# Patient Record
Sex: FEMALE | Race: BLACK OR AFRICAN AMERICAN | URBAN - METROPOLITAN AREA
[De-identification: names, ages, dates, MRNs, and addresses within clinical notes are randomized per-mention and may not be internally consistent; named-entity substitution may affect disease eponyms.]

---

## 2017-01-04 ENCOUNTER — OFFICE VISIT (OUTPATIENT)
Dept: PALLIATIVE MEDICINE | Facility: CLINIC | Age: 67
End: 2017-01-04
Payer: COMMERCIAL

## 2017-01-04 VITALS
DIASTOLIC BLOOD PRESSURE: 75 MMHG | SYSTOLIC BLOOD PRESSURE: 131 MMHG | WEIGHT: 172 LBS | HEART RATE: 87 BPM | BODY MASS INDEX: 37.11 KG/M2 | HEIGHT: 57 IN

## 2017-01-04 DIAGNOSIS — M75.42 IMPINGEMENT SYNDROME OF LEFT SHOULDER: ICD-10-CM

## 2017-01-04 DIAGNOSIS — G89.29 CHRONIC BILATERAL LOW BACK PAIN WITH LEFT-SIDED SCIATICA: ICD-10-CM

## 2017-01-04 DIAGNOSIS — M54.16 LUMBAR RADICULOPATHY: ICD-10-CM

## 2017-01-04 DIAGNOSIS — M47.819 FACET ARTHROPATHY: ICD-10-CM

## 2017-01-04 DIAGNOSIS — M25.512 CHRONIC LEFT SHOULDER PAIN: Primary | ICD-10-CM

## 2017-01-04 DIAGNOSIS — M54.12 CERVICAL RADICULOPATHY: ICD-10-CM

## 2017-01-04 DIAGNOSIS — M54.42 CHRONIC BILATERAL LOW BACK PAIN WITH LEFT-SIDED SCIATICA: ICD-10-CM

## 2017-01-04 DIAGNOSIS — G89.29 CHRONIC LEFT SHOULDER PAIN: Primary | ICD-10-CM

## 2017-01-04 DIAGNOSIS — M54.2 CERVICALGIA: ICD-10-CM

## 2017-01-04 PROCEDURE — 99215 OFFICE O/P EST HI 40 MIN: CPT | Performed by: ANESTHESIOLOGY

## 2017-01-04 RX ORDER — GABAPENTIN 100 MG/1
CAPSULE ORAL
Qty: 180 CAPSULE | Refills: 1 | Status: SHIPPED | OUTPATIENT
Start: 2017-01-04

## 2017-01-04 ASSESSMENT — PAIN SCALES - GENERAL: PAINLEVEL: EXTREME PAIN (8)

## 2017-01-04 NOTE — NURSING NOTE
"Chief Complaint   Patient presents with     Pain       Initial /75 mmHg  Pulse 87  Ht 1.448 m (4' 9\")  Wt 78.019 kg (172 lb)  BMI 37.21 kg/m2 Estimated body mass index is 37.21 kg/(m^2) as calculated from the following:    Height as of this encounter: 1.448 m (4' 9\").    Weight as of this encounter: 78.019 kg (172 lb).  BP completed using cuff size: Sujata Russell MA      "

## 2017-01-04 NOTE — PROGRESS NOTES
[facility]    Date of visit: 1/4/2017    Chief complaint:   Chief Complaint   Patient presents with     Pain       Interval history:  Marlon Roberto was last seen by me on 11/30/16.      Recommendations/plan at the last visit included:  Plan:  1.  Pain physical therapy eval and treat  2.  Gabapentin 100 mg start with one at bedtime for 3 days, then one twice a day for 3 days, then one 3 times per day  3.  Continue Ibuprofen 800 mg no more than 3 times per day  4.  Would benefit from cervical (cervical) epidural steroid injection to target the left C4 nerve root for shoulder pain - patient is not ready for injections yet  5.  Would likely benefit from lumbar (lower back) epidural steroid injection as well due to stenosis (narrowing) at L4-5 - patient is not ready for injections yet  6.  Patient is not ready for pain psychology yet  7.  Trial of muscle relaxant (flexeril)  - start with a half tablet up to 3 times per day  8.  Follow up in 4-6 weeks    Since her last visit, Marlon Roberto reports:  She states that her pain is well controlled with Ibuprofen while at home and she can control her activities.  She has only been taking the Gabapentin while at work when her pain is bad and is not sure if it is helping or not.  She did not increase to three times per day as instructed.  She did not go to physical therapy stating insurance reasons.  She states she will schedule therapy now that the new year has started.    Her worst pain today is in the left side of the neck and down the left arm to about the elbow.  Her pain feels deep inside with a heavy feeling in the left shoulder.  Her right side is not bothering her today.  Her left arm feels weak at times.  She still gets some numbness in her hands after sleeping, usually on the left.    Her pain is worsened while at work with lifting and bending activities and with use of the arms.  She states that her whole back hurts but can't be sure  what hurts her the worst.  She has pain in the lower back with bending and twisting activities without pain down the legs.  She is very non-specific about her pain complaints.    She presents today with work restrictions form to be filled out for her employer.    Pain scores:  Pain intensity on average is 8 on a scale of 0-10. 10/10 while at work    Current pain treatments:   Gabapentin 100 mg TID - states she only takes while working  Ibuprofen 800 mg TID  Robaxin 500 mg - takes when she is ready to sleep    Past pain treatments:  Robaxin 500 mg  - prescribed but did not take  Flexeril - prescribed but did not take    Side Effects: edema and dizziness    Medications:  Current Outpatient Prescriptions   Medication Sig Dispense Refill     metoprolol (TOPROL-XL) 50 MG 24 hr tablet TAKE 1 TABLET (50 MG) BY MOUTH DAILY FOR BLOOD PRESSURE. 90 tablet 0     pravastatin (PRAVACHOL) 40 MG tablet TAKE ONE TABLET BY MOUTH DAILY IN THE EVENING . 90 tablet 1     ibuprofen (ADVIL/MOTRIN) 800 MG tablet TAKE 1 TABLET BY MOUTH 3 TIMES DAILY WITH MEALS AS NEEDED FOR JOINT/BACK PAIN 90 tablet 3     triamterene-hydrochlorothiazide (MAXZIDE-25) 37.5-25 MG per tablet TAKE ONE TABLET BY MOUTH DAILY 90 tablet 1     methocarbamol (ROBAXIN) 500 MG tablet Take 2 tablets (1,000 mg) by mouth 3 times daily as needed for muscle spasms 30 tablet 1     pravastatin (PRAVACHOL) 20 MG tablet Take 2 tablets (40 mg) by mouth every evening for cholesterol. 180 tablet 1     ORDER FOR DME Equipment being ordered: TEDS socks, closed toe calf high. 1 Device o     ASPIRIN 325 MG OR TBEC 1 tablet daily*       gabapentin (NEURONTIN) 100 MG capsule Take 1 capsule (100 mg) by mouth 3 times daily Take one at bedtime for 3 days, then one twice a day for 3 days, then one 3 times per day 90 capsule 0     Acetaminophen (TYLENOL PO)        PARoxetine (PAXIL) 20 MG tablet Take 1 tablet (20 mg) by mouth At Bedtime for anxiety prevention. 30 tablet 1     diazepam (VALIUM)  "2 MG tablet Take 0.5-2 tablets (1-4 mg) by mouth once as needed for anxiety or agitation (including before flying) 10 tablet 0     cetirizine (ZYRTEC) 10 MG tablet Take 1 tablet (10 mg) by mouth 2 times daily as needed for allergies (itching) 365 tablet prn       Medical History: any changes in medical history since they were last seen? No    Review of Systems:  The 14 system ROS was reviewed from the intake questionnaire, and is positive for: joint pain, joint stiffness, neck pain, back pain, painful urination  Any bowel or bladder problems: denies changes  Mood: good    Physical Exam:  /75 mmHg  Pulse 87  Ht 1.448 m (4' 9\")  Wt 78.019 kg (172 lb)  BMI 37.21 kg/m2  Constitutional: alert and no distress.  Pt is obese  Head: Normocephalic. No masses, lesions, tenderness or abnormalities  ENT: EOMI, mucosal surfaces moist.  Neck with full ROM, posture fair, hyperkyphotic thoracic spine  Cardiovascular: No edema or JVD appreciated.  Respiratory: Good diaphragmatic excursion. No wheezes appreciated.  Speaking in complete sentences without shortness of breath.  No accessory muscle use.   Musculoskeletal: extremities normal- no gross deformities noted, normal muscle tone and able to move about the exam room without difficulty.    Skin: no suspicious lesions or rashes appreciated on exposed areas  Neurologic: Gait normal, non-antalgic.  Able to stand on toes and heels. Moving all extremities spontaneously, no apparent weakness.    Psychiatric: mentation appears normal, affect full and good eye contact.      Cervical Spine:  Good range of motion, tender paravertebral muscles on the left with tenderness and spasm in the left trapezius.  Facet loading equivocal.  Spurling's not assessed    Lumbar Spine:  Tender lower lumbar paravertebrals, left greater than right, flexes well with lower back pain on straightening up. Facet loading positive left greater than right with extension and lateral rotation of the lumbar " spine.  Seated straight leg raising is negative bilaterally.    MR CERVICAL SPINE W/O CONTRAST 8/23/2016 11:00 AM   Findings:  Minimal retrolisthesis of C3 on C4. Preservation of the normal  cervical lordosis. Mild multilevel disc height loss, most probably a  C3-C4. Normal marrow signal. Normal cord signal and morphology.     The findings on a level by level basis are as follows:     C2-3: Disc osteophyte complex causing abuts the ventral aspect of the cord. Mild spinal canal narrowing. No significant neural foraminal  stenosis.     C3-4:  Disc osteophyte complex and uncinate hypertrophy. Ligament flavum redundancy. Mild/moderate left and mild right neural foraminal stenosis. Mild spinal canal stenosis.     C4-5:  No significant spinal canal or neural foraminal stenosis.     C5-6:  No significant spinal canal or neural foraminal stenosis.     C6-7:  No significant spinal canal or neural foraminal stenosis.     C7-T1:  Disc osteophyte complex. No significant neural foraminal or  spinal canal stenosis.      No abnormality of the paraspinous soft tissues.                                                                     Impression:    1. Cervical spondylosis as detailed above. At C3-C4, mild spinal canal  stenosis, mild to moderate left and mild right neural foraminal  stenosis.  2. No abnormal signal within the cervical cord.    MRI LUMBAR SPINE W/O CONTRAST 12/9/2014 12:11 PM  Impression:  Degenerative changes throughout the lumbar spine primarily consisting of facet arthropathy and ligamentum flavum thickening which results in mild to moderate narrowing of the spinal canal at L4-L5 and mild narrowing at L3-L4. Mild bilateral neural foraminal narrowing at L3-L4, L4-L5 and L5-S1.    Assessment:   1.  Chronic pain syndrome  2.  Chronic lower back pain  3.  Chronic neck pain  4.  Left shoulder pain  5.  Cervical radiculopathy - foraminal narrowing at C3-4 left  6.  Lumbar radiculopathy   7.  Myofascial pain (muscle  pain)  8.  Lumbosacral facet joint arthropathy  9.  Left shoulder pain with impingement    Marlon Roberto is a 66 year old female who is seen at the pain clinic for chronic pain that seems to be worse in her lower back and in the left shoulder.  She does have positive facet loading pain in the lumbar spine which may benefit from facet joint injections.  She also has deep shoulder pain with impingement suggesting rotator cuff problem.  She is very resistant to treatment with medications or injections and has not yet started physical therapy as instructed.    Plan:  1. Physical Therapy:  Proceed with physical therapy as ordered  2. Clinical Health Psychologist to address issues of relaxation, behavioral change, coping style, and other factors important to improvement.  deferred  3. Diagnostic Studies:  MRI left shoulder to rule out rotator cuff tear/injury  4. Medication Management:    1. Continue Ibuprofen 800 mg TID as needed  2. Increase Gabapentin to 200 mg three times per day  3. Continue Robaxin as needed  5. Further procedures recommended:   1. Lumbar facet joint injections bilaterally at L4-5 and L5-S1  2. Left shoulder subacromial bursa injection  6. Recommendations to PCP: defer pain complaints to pain management at this time.  However, if patient continues to decline treatments I won't have anything further to offer  7. Follow up: 6-8 weeks    Total time spent was 45 minutes, and more than 50% of face to face time was spent in counseling and/or coordination of care regarding medication management, physical therapy, behavioral modifications, and interventional procedures to decrease pain and improve function.

## 2017-01-04 NOTE — MR AVS SNAPSHOT
After Visit Summary   1/4/2017    Marlon Roberto    MRN: [id number]           Patient Information     Date Of Birth          1950        Visit Information        Provider Department      1/4/2017 10:00 AM Bert Fiore MD [facility]        Today's Diagnoses     Chronic left shoulder pain    -  1     Impingement syndrome of left shoulder         Cervicalgia         Chronic bilateral low back pain with left-sided sciatica         Cervical radiculopathy         Lumbar radiculopathy           Care Instructions    Assessment:   1.  Chronic pain syndrome  2.  Chronic lower back pain  3.  Chronic neck pain  4.  Left shoulder pain  5.  Cervical radiculopathy - foraminal narrowing at C3-4 left  6.  Lumbar radiculopathy   7.  Myofascial pain (muscle pain)  8.  Lumbosacral facet joint arthropathy  9.  Left shoulder pain with impingement    Marlon Roberto is a 66 year old female who is seen at the pain clinic for chronic pain that seems to be worse in her lower back and in the left shoulder.  She does have positive facet loading in the lumbar spine which may benefit from facet joint injections.  She also has deep shoulder pain with impingement suggesting rotator cuff problem.    Plan:  1. Physical Therapy:  Proceed with physical therapy as ordered  2. Clinical Health Psychologist to address issues of relaxation, behavioral change, coping style, and other factors important to improvement.  deferred  3. Diagnostic Studies:  MRI left shoulder to rule out rotator cuff tear/injury  4. Medication Management:    1. Continue Ibuprofen 800 mg TID as needed  2. Increase Gabapentin to 200 mg three times per day  3. Continue Robaxin as needed  5. Further procedures recommended:   1. Lumbar facet joint injections bilaterally at L4-5 and L5-S1  2. Left shoulder subacromial bursa injection  6. Recommendations to PCP: defer pain complaints to pain management at this time  7. Patient may  continue to work with restrictions to light duty and should refrain from frequent lifting, carrying, pushing, pulling, and bending or prolonged standing.  Refrain from lifting greater than 15 pounds.  8. Follow up: 6-8 weeks      Nurse Triage line:  [phone]   Call this number with any questions or concerns. You may leave a detailed message anytime. Calls are typically returned Monday through Friday between 8 AM and 4:30 PM. We usually get back to you within 2 business days depending on the issue/request.       Medication refills:    For non-narcotic medications, call your pharmacy directly to request a refill. The pharmacy will contact the [facility] for authorization. Please allow 3-4 days for these refills to be processed.     For narcotic refills, call the nurse triage line or send a Yap message. Please contact us 7-10 days before your refill is due. The message MUST include the name of the specific medication(s) requested and how you would like to receive the prescription(s). The options are as follows:    Pain Clinic staff can mail the prescription to your pharmacy. Please tell us the name of the pharmacy.    You may pick the prescription up at the Pain Clinic (tell us the location) or during a clinic visit with your pain provider    Pain Clinic staff can deliver the prescription to the [place] pharmacy in the clinic building. Please tell us the location.      Scheduling number: [phone].  Call this number to schedule or change appointments.    We believe regular attendance is key to your success in our program.    Any time you are unable to keep your appointment we ask that you call us at least 24 hours in advance to let us know. This will allow us to offer the appointment time to another patient.             Follow-ups after your visit        Future tests that were ordered for you today     Open Future Orders        Priority Expected Expires Ordered    MR Up Ext Non Joint Bl w/o  "Contrast Routine  2018            Who to contact     If you have questions or need follow up information about today's clinic visit or your schedule please contact [facility] directly at [phone].  Normal or non-critical lab and imaging results will be communicated to you by MyChart, letter or phone within 4 business days after the clinic has received the results. If you do not hear from us within 7 days, please contact the clinic through MyChart or phone. If you have a critical or abnormal lab result, we will notify you by phone as soon as possible.  Submit refill requests through [employer] or call your pharmacy and they will forward the refill request to us. Please allow 3 business days for your refill to be completed.          Additional Information About Your Visit        [employer] Information     [employer] lets you send messages to your doctor, view your test results, renew your prescriptions, schedule appointments and more. To sign up, go to [email or web address] . Click on \"Log in\" on the left side of the screen, which will take you to the Welcome page. Then click on \"Sign up Now\" on the right side of the page.     You will be asked to enter the access code listed below, as well as some personal information. Please follow the directions to create your username and password.     Your access code is: [email or web address]  Expires: 2017  3:31 PM     Your access code will  in 90 days. If you need help or a new code, please call your [facility] clinic or [phone].        Care EveryWhere ID     This is your Care EveryWhere ID. This could be used by other organizations to access your [facility] medical records  [id number]        Your Vitals Were     Pulse Height BMI (Body Mass Index)             87 1.448 m (4' 9\") 37.21 kg/m2          Blood Pressure from Last 3 Encounters:   17 131/75   16 109/61   16 107/66    Weight from Last 3 Encounters:   17 78.019 kg (172 " lb)   11/30/16 77.111 kg (170 lb)   11/22/16 78.019 kg (172 lb)                 Today's Medication Changes          These changes are accurate as of: 1/4/17 10:52 AM.  If you have any questions, ask your nurse or doctor.               These medicines have changed or have updated prescriptions.        Dose/Directions    gabapentin 100 MG capsule   Commonly known as:  NEURONTIN   This may have changed:    - how much to take  - how to take this  - when to take this  - additional instructions   Used for:  Cervicalgia, Chronic bilateral low back pain with left-sided sciatica, Cervical radiculopathy, Lumbar radiculopathy        Take two tablets three times per day   Quantity:  180 capsule   Refills:  1            Where to get your medicines      These medications were sent to [id number]  [address]  [address] [place] [address]     Phone:  [phone]    - gabapentin 100 MG capsule             Primary Care Provider Office Phone # Fax #    Lambert Moss [phone]       [facility] [address] [facility] [address]        Thank you!     Thank you for choosing [facility]  for your care. Our goal is always to provide you with excellent care. Hearing back from our patients is one way we can continue to improve our services. Please take a few minutes to complete the written survey that you may receive in the mail after your visit with us. Thank you!             Your Updated Medication List - Protect others around you: Learn how to safely use, store and throw away your medicines at www.disposemymeds.org.          This list is accurate as of: 1/4/17 10:52 AM.  Always use your most recent med list.                   Brand Name Dispense Instructions for use    aspirin 325 MG EC tablet      1 tablet daily*       cetirizine 10 MG tablet    zyrTEC    365 tablet    Take 1 tablet (10 mg) by mouth 2 times daily as needed for allergies (itching)        diazepam 2 MG tablet    VALIUM    10 tablet    Take 0.5-2 tablets (1-4 mg) by mouth once as needed for anxiety or agitation (including before flying)       gabapentin 100 MG capsule    NEURONTIN    180 capsule    Take two tablets three times per day       ibuprofen 800 MG tablet    ADVIL/MOTRIN    90 tablet    TAKE 1 TABLET BY MOUTH 3 TIMES DAILY WITH MEALS AS NEEDED FOR JOINT/BACK PAIN       methocarbamol 500 MG tablet    ROBAXIN    30 tablet    Take 2 tablets (1,000 mg) by mouth 3 times daily as needed for muscle spasms       metoprolol 50 MG 24 hr tablet    TOPROL-XL    90 tablet    TAKE 1 TABLET (50 MG) BY MOUTH DAILY FOR BLOOD PRESSURE.       order for DME     1 Device    Equipment being ordered: TEDS socks, closed toe calf high.       PARoxetine 20 MG tablet    PAXIL    30 tablet    Take 1 tablet (20 mg) by mouth At Bedtime for anxiety prevention.       * pravastatin 20 MG tablet    PRAVACHOL    180 tablet    Take 2 tablets (40 mg) by mouth every evening for cholesterol.       * pravastatin 40 MG tablet    PRAVACHOL    90 tablet    TAKE ONE TABLET BY MOUTH DAILY IN THE EVENING .       triamterene-hydrochlorothiazide 37.5-25 MG per tablet    MAXZIDE-25    90 tablet    TAKE ONE TABLET BY MOUTH DAILY       TYLENOL PO          * Notice:  This list has 2 medication(s) that are the same as other medications prescribed for you. Read the directions carefully, and ask your doctor or other care provider to review them with you.

## 2017-01-04 NOTE — PATIENT INSTRUCTIONS
Assessment:   1.  Chronic pain syndrome  2.  Chronic lower back pain  3.  Chronic neck pain  4.  Left shoulder pain  5.  Cervical radiculopathy - foraminal narrowing at C3-4 left  6.  Lumbar radiculopathy   7.  Myofascial pain (muscle pain)  8.  Lumbosacral facet joint arthropathy  9.  Left shoulder pain with impingement    Marlon Roberto is a 66 year old female who is seen at the pain clinic for chronic pain that seems to be worse in her lower back and in the left shoulder.  She does have positive facet loading in the lumbar spine which may benefit from facet joint injections.  She also has deep shoulder pain with impingement suggesting rotator cuff problem.    Plan:  1. Physical Therapy:  Proceed with physical therapy as ordered  2. Clinical Health Psychologist to address issues of relaxation, behavioral change, coping style, and other factors important to improvement.  deferred  3. Diagnostic Studies:  MRI left shoulder to rule out rotator cuff tear/injury  4. Medication Management:    1. Continue Ibuprofen 800 mg TID as needed  2. Increase Gabapentin to 200 mg three times per day  3. Continue Robaxin as needed  5. Further procedures recommended:   1. Lumbar facet joint injections bilaterally at L4-5 and L5-S1  2. Left shoulder subacromial bursa injection  6. Recommendations to PCP: defer pain complaints to pain management at this time  7. Patient may continue to work with restrictions to light duty and should refrain from frequent lifting, carrying, pushing, pulling, and bending or prolonged standing.  Refrain from lifting greater than 15 pounds.  8. Follow up: 6-8 weeks      Nurse Triage line:  [phone]   Call this number with any questions or concerns. You may leave a detailed message anytime. Calls are typically returned Monday through Friday between 8 AM and 4:30 PM. We usually get back to you within 2 business days depending on the issue/request.       Medication refills:    For non-narcotic  medications, call your pharmacy directly to request a refill. The pharmacy will contact the [facility] for authorization. Please allow 3-4 days for these refills to be processed.     For narcotic refills, call the nurse triage line or send a brettapproved message. Please contact us 7-10 days before your refill is due. The message MUST include the name of the specific medication(s) requested and how you would like to receive the prescription(s). The options are as follows:    Pain Clinic staff can mail the prescription to your pharmacy. Please tell us the name of the pharmacy.    You may pick the prescription up at the Pain Clinic (tell us the location) or during a clinic visit with your pain provider    Pain Clinic staff can deliver the prescription to the [facility] pharmacy in the clinic building. Please tell us the location.      Scheduling number: [phone].  Call this number to schedule or change appointments.    We believe regular attendance is key to your success in our program.    Any time you are unable to keep your appointment we ask that you call us at least 24 hours in advance to let us know. This will allow us to offer the appointment time to another patient.

## 2017-01-12 ENCOUNTER — TELEPHONE (OUTPATIENT)
Dept: PALLIATIVE MEDICINE | Facility: CLINIC | Age: 67
End: 2017-01-12

## 2017-01-12 NOTE — TELEPHONE ENCOUNTER
Forms filled out by Dr. Hilda Coronado for patient. Forms for Certification of Health Care Provider.  Copy made and sent to scanning. Original mailed to patient's home address. Unable to reach patient by phone, as there is a recording that states that the phone has not been set up yet.

## 2017-01-19 ENCOUNTER — TELEPHONE (OUTPATIENT)
Dept: PALLIATIVE MEDICINE | Facility: CLINIC | Age: 67
End: 2017-01-19

## 2017-01-19 DIAGNOSIS — G89.29 CHRONIC SHOULDER PAIN: Primary | ICD-10-CM

## 2017-01-19 DIAGNOSIS — M25.519 CHRONIC SHOULDER PAIN: Primary | ICD-10-CM

## 2017-01-19 NOTE — TELEPHONE ENCOUNTER
"Attempted to call pt and rcvd message that \"magic mirta customer could not be reached\".     Will try again at a later date.     Linda Tsai RN, [facility]  Pain Clinic Care Coordinator   "

## 2017-01-19 NOTE — TELEPHONE ENCOUNTER
This patient left a message on the nurse line on 1/19/17 at 11:00 AM    She reports she is trying to schedule an MRI ordered from Dr. Hilda Coronado so she can have it completed for her next office visit but there is a problem with the order. She would like someone to call her back so the order can be fixed and resent. Call back # is [phone].    NAKUL Hudson, RN  Care Coordinator  [facility]

## 2017-01-23 ENCOUNTER — OFFICE VISIT (OUTPATIENT)
Dept: PALLIATIVE MEDICINE | Facility: CLINIC | Age: 67
End: 2017-01-23
Payer: COMMERCIAL

## 2017-01-23 DIAGNOSIS — M25.512 CHRONIC LEFT SHOULDER PAIN: ICD-10-CM

## 2017-01-23 DIAGNOSIS — M54.42 CHRONIC BILATERAL LOW BACK PAIN WITH LEFT-SIDED SCIATICA: ICD-10-CM

## 2017-01-23 DIAGNOSIS — G89.4 CHRONIC PAIN SYNDROME: Primary | ICD-10-CM

## 2017-01-23 DIAGNOSIS — G89.29 CHRONIC LEFT SHOULDER PAIN: ICD-10-CM

## 2017-01-23 DIAGNOSIS — G89.29 CHRONIC BILATERAL LOW BACK PAIN WITH LEFT-SIDED SCIATICA: ICD-10-CM

## 2017-01-23 DIAGNOSIS — M54.12 CERVICAL RADICULOPATHY: ICD-10-CM

## 2017-01-23 DIAGNOSIS — M54.16 LUMBAR RADICULOPATHY: ICD-10-CM

## 2017-01-23 PROCEDURE — G8979 MOBILITY GOAL STATUS: HCPCS | Mod: CJ | Performed by: PHYSICAL THERAPIST

## 2017-01-23 PROCEDURE — 97162 PT EVAL MOD COMPLEX 30 MIN: CPT | Mod: GP | Performed by: PHYSICAL THERAPIST

## 2017-01-23 PROCEDURE — G8978 MOBILITY CURRENT STATUS: HCPCS | Mod: CK | Performed by: PHYSICAL THERAPIST

## 2017-01-23 PROCEDURE — 97110 THERAPEUTIC EXERCISES: CPT | Mod: GP | Performed by: PHYSICAL THERAPIST

## 2017-01-23 NOTE — PROGRESS NOTES
"PHYSICAL THERAPY INITIAL EVALUATION and PLAN OF CARE    Patient Name: Marlon Roberto     : 1950    MRN: [id number]   Pain Management Provider:  Dr. Bert Coronado      Diagnosis:    Chronic pain syndrome  Chronic bilateral low back pain with left-sided sciatica  Chronic left shoulder pain  Lumbar radiculopathy  Cervical radiculopathy    SUBJECTIVE:    PRESENTATION AND ETIOLOGY    Chief Complaint: Having issues with L shoulder and back pain; having difficulty with L arm; can't use it for a lot of things because of pain and lack of movement    Hx of B TKAs; hx of anxiety/situtional depression--sister  recently    Recently started on gabapentin    Onset / Etiology: shoulder issues: about a year--getting worse; Back: for years; no specific injury    Pattern Since Onset: Worsened    Pain is described as Shoulder: sharp, dull, Back: aching; deep, deep pain; increases with activity; it just hurts     Frequency: Constant, intensity changes; Depends on what I am doing     Intensity: Best 4/10, Worst 10/10;  Current 4-5/10 ; Average 8/10; gets to 10/10 about every day when having to work--both back and L shoulder are painful    SENSATION: numbness and tingling in UEs if sleeping on that side    Sleep: shoulder bothers more at night;     R handed    LEVEL OF FUNCTION AT START OF CARE    Walking tolerance: no issues; moving around helps; L knee sore; R TKA did better than L one; feels like she is walking slower since TKAs, more flexed  Sitting tolerance: 10-15 mins  Standing tolerance: standing too long is the worst; 5-10 mins    Current Aggravating Activities / Functional Limitations: reaching up for things; lifting; hard to do my hair, bathing; getting dressed; bending at the back; pushing/pulling;p vacuuming; \"physical work\" with job      CURRENT / PREVIOUS INTERVENTION(S):     Relieving Activities / Self Care: Meds; know my limits; resting; has tried heat in the past--hasn't done recently    Previous / " Current therapies for current chief complaint: PT: had after TKAs, Chiropractic - maybe years ago, Acupuncture: don't like needles    Prior Functional Level:  Does have work restrictions; on light duty; 15# lifting restriction    DEMOGRAPHICS  Employment Status: work in group home; 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    Patient's perceived quality of life:  good    Pertinent Medical  / Surgical History: Epic Snapshot Reviewed:  Contributing factors to the severity / complexity of the patient's chief complaint include:  See provider's note    Patient's goals for physical therapy: none stated    ===============================================================  OBJECTIVE:  POSTURE:  Observation: Guarded posture; slight forward head; rounded shoulders; clenching jaw; poor core engagement with seated posture; Able to speak in full sentences without SOB or cough noted;  upper chest breathing noted; leaning to R in sitting and standing    GAIT, LOCOMOTION, and BALANCE:  Gait and Locomotion: Antalgic; moves slowly; decreased heel to toe gait, decreased knee extension bilaterally with heel strike; slight forward flexed    Balance: Demo poor SLS with UE support; poor core stability with SLS    RANGE OF MOTION:   Cervical: decreased cervical ROM; attempts to move body when moving head  Shoulders: WFL on R; ~ 120 flexion, 90 abduction, barely able to reach behind back on L; pain with all L movements  Lumbar: able to perform minimal flexion  Hips: limited ER/IR on L as compared to R; decreased bilaterally  Knee: unable to get full knee extension bilaterally, knee flexion WFL following TKA    MUSCLE PERFORMANCE:   Strength: demo core instability; poor scapular humeral rhythm; able to do Heel  raises UE support; able to perform partial squat with UE support, leaning to the R    Flexibility: tightness noted in HS, upper traps, pects, levator scap, GS, hip flexors    FUNCTIONAL TESTING/OBSERVATION: moves slowly with sit to/from stand    Pain behaviors: None    Information gathered through testing and observation    ===============================================================  Today's Treatment:  Initial evaluation  Therapeutic Procedures/Exercises: dicussed basics of Pain PT and exercise, working on consistency vs intensity; discussed walking program; gave handout; discussed focused movement, OK to break up during the day; cues for more upright posture, ab sets with walking  Discussed Pain Center PT and POC.  ===============================================================  ASSESSMENT:  Physical Therapy Diagnosis:Impaired Posture and Impaired Muscle Performance    Patient requires PT intervention for the following impairments: Limited knowledge of condition and / or self care - inability to control symptoms, Impaired insight, Impaired functional mobility, Impaired gait / weight bearing tolerance, Impaired posture / muscle imbalance, Impaired static and / or dynamic balance, Muscle flexibility limitations, Muscle strength and endurance limitations, Pain, ROM limitations and Deconditioning    Anticipated Goals and Expected Outcomes:    Goals   8 weeks  Patient will report the use of 2 self care practices during their day.  Patient will report the participation in 10 minutes of aerobic activity daily and practicing stretching daily.  Patient will demonstrate the ability to find core strength in neutral posture.  Patient will demonstrate the ability to relax muscle group before stretching.    16 weeks  Patient will be independent with a home exercise program.  Patient will be independent with posture correction.  Patient will report independence with a self care/flare management program.  Patient will  demonstrate improved functional strength and endurance as reports by increased tolerance for IADLs and more consistent participation in daily activity.     Rehab potential for achieving goals: fair.    ===============================================================  PLAN:   Patient will benefit from skilled physical therapy consisting of:  neuromuscular reeducation of: kinesthetic sense and posture for sitting and/or standing activities, education in self care / home management training to include instruction in: symptom control techniques, therapeutic activities to achieve improved functional performance in: daily actvities and therapeutic exercise to develop: strength and endurance, flexibility and core stability.       Assessment will be ongoing with changes in treatment as indicated.  Benefits/risks/alternatives to treatment have been reviewed and the patient has been instructed to contact this office if they have any questions or concerns.  This plan of care has been discussed with the patient and the patient is in agreement.     Frequency / Duration:  Patient will be seen for a total of 8-10 visits;  at a frequency of once every 1-2 weeks for 3-4 visits, once every 2-4 weeks for 3-4 visits, then once every 4-6 weeks.  May need to spread out visits more secondary to job/transportation issues.    Next Visit: Focus on posture, HEP, TNE    Total Visit Time:  45 minutes  Eval: 30  mins  TE: 15 mins             Adelaida Romero, PT                                      Date:  1/23/2017    Therapy Evaluation Codes:   1) History comprised of:                  Personal factors that impact the plan of care:                                    Age, Anxiety, Coping style, Language and Time since onset of symptoms.                   Comorbidity factors that impact the plan of care are:                                    B TKAs.                    Medications impacting care: gabapentin  2) Examination of Body Systems  comprised of:                  Body structures and functions that impact the plan of care:                                    Cervical spine, Knee, Lumbar spine and Shoulder.                  Activity limitations that impact the plan of care are:                                    Bathing, Bending, Cooking, Dressing, Lifting, Sitting, Standing, Working and reaching; overhead activities.  3) Clinical presentation characteristics are:                  Evolving/Changing.  4) Decision-Making                                  Moderate complexity using standardized patient assessment instrument and/or measureable assessment of functional outcome.  Cumulative Therapy Evaluation is: Moderate complexity.      =====================================================  **  Referring Provider Certification: Referring Provider reviewing certifies that the above treatment / plan of care is required and authorized, and that the patient's plan will be reviewed every thirty (30) days **.   ======================================================     PRESENT:  NA    MULTIDISCIPLINARY PATIENT / FAMILY EDUCATION RECORD  Department:  Physical Therapy    Readiness to Learn: Ability to understand verbal instructions, Ability to understand written instructions, Knowledge of educational needs / treatment plan  Specific Barriers to Learning: None  Referrals: None  Learning Needs: Rehabilitation techniques to improve functional independence Pain management education to improve daily activity tolerance.  Who: Patient  How: Demonstration, Verbal instructions, Written instructions  Response: Appropriate verbal response, Asked questions, Demonstrated ability, Verbalized recall / understanding

## 2017-01-23 NOTE — TELEPHONE ENCOUNTER
Patient stopped in for physical therapy on 01/23/17.  At that time, she said she could not get her MRI scheduled due to an error in ordering. I called image scheduling and asked them what needed to be changed for the order to be valid.  She told me it needed to be ordered as [id number].  Since Dr. Hilda Coronado was in [place] this day, I emailed him the information.  Asked patient to try and schedule tomorrow afternoon.

## 2017-01-24 NOTE — TELEPHONE ENCOUNTER
Attempted to call pt to inform her order was changed but was unable to get through.  Will leave encounter open for a while.     Linda Tsai RN, [facility]  Pain Clinic Care Coordinator

## 2017-01-27 NOTE — TELEPHONE ENCOUNTER
MRI scheduled closing encounter.     Linda Tsai RN, [facility]  Pain Clinic Care Coordinator

## 2017-01-31 ENCOUNTER — RADIANT APPOINTMENT (OUTPATIENT)
Dept: MRI IMAGING | Facility: CLINIC | Age: 67
End: 2017-01-31
Attending: ANESTHESIOLOGY
Payer: COMMERCIAL

## 2017-01-31 DIAGNOSIS — M25.519 CHRONIC SHOULDER PAIN: ICD-10-CM

## 2017-01-31 DIAGNOSIS — G89.29 CHRONIC SHOULDER PAIN: ICD-10-CM

## 2017-01-31 PROCEDURE — 73221 MRI JOINT UPR EXTREM W/O DYE: CPT | Mod: TC

## 2017-02-02 ENCOUNTER — TELEPHONE (OUTPATIENT)
Dept: PALLIATIVE MEDICINE | Facility: CLINIC | Age: 67
End: 2017-02-02

## 2017-02-02 NOTE — LETTER
[facility]  [address]  [place] [address]  [phone]      February 13, 2017      Marlon Roberto  [address] [facility] [address]    Re:  MRI of shoulder results      Dear Marlon,    We have tried and failed to reach you via telephone.  Dr. Hilda Coronado has reviewed you shoulder MRI results.  Here was his response:     MRI shoulder reviewed.  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  She needs to be evaluated by Ortho - she has been seen in the past by Dr Jeovanny Cruz with sports medicine.             Bert Coronado MD      [facility]     Please follow up with Dr. Cruz or call us on the nurse line if you have further [phone].      Sincerely,      Blanca Hogan RN-BSN  [facility]

## 2017-02-02 NOTE — PROGRESS NOTES
G Code 1/10  Mobility Status  G 8978 Current Status  CK 40-59% impaired   G 8979 Goal Status CJ 20-39% impaired     G Codes established based on subjective and objective measures.

## 2017-02-02 NOTE — TELEPHONE ENCOUNTER
Bert Fiore MD     Sent: Wed February 01, 2017  8:43 AM     To: P [facility] Nurse          Result Note      MRI shoulder reviewed.  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  She needs to be evaluated by Ortho - she has been seen in the past by Dr Jeovanny Cruz with sports medicine.          Bert Coronado MD     [facility]

## 2017-02-03 NOTE — TELEPHONE ENCOUNTER
Attempted to call pt and was not able to L/M or get through to pt. Pt does not have a follow up appt with anyone till 2/23/2017.     Will try again at a later date.     Linda Tsai RN, [facility]  Pain Clinic Care Coordinator

## 2017-02-13 NOTE — TELEPHONE ENCOUNTER
A letter was sent to pt's home w/ Dr. Hilda Coronado's result notes.    See the letter section for that information.    Blanca Hogan RN-BSN  [facility]

## 2017-03-14 DIAGNOSIS — R09.81 CONGESTION OF PARANASAL SINUS: ICD-10-CM

## 2017-03-14 NOTE — TELEPHONE ENCOUNTER
amoxicillin-clavulanate (AUGMENTIN) 875-125 MG per tablet      Last Written Prescription Date:  10/17/16  Last Fill Quantity: 20,   # refills: 0  Last Office Visit with [facility] or [facility] prescribing provider: 10/17/16  Future Office visit:       Routing refill request to provider for review/approval because:  Drug not on the [facility], [facility] or [facility] refill protocol or controlled substance  Drug not active on patient's medication list      [facility]

## 2017-03-16 ENCOUNTER — OFFICE VISIT (OUTPATIENT)
Dept: FAMILY MEDICINE | Facility: CLINIC | Age: 67
End: 2017-03-16
Payer: COMMERCIAL

## 2017-03-16 ENCOUNTER — RADIANT APPOINTMENT (OUTPATIENT)
Dept: GENERAL RADIOLOGY | Facility: CLINIC | Age: 67
End: 2017-03-16
Attending: NURSE PRACTITIONER
Payer: COMMERCIAL

## 2017-03-16 VITALS
SYSTOLIC BLOOD PRESSURE: 124 MMHG | DIASTOLIC BLOOD PRESSURE: 78 MMHG | WEIGHT: 172 LBS | HEART RATE: 75 BPM | OXYGEN SATURATION: 100 % | BODY MASS INDEX: 37.11 KG/M2 | TEMPERATURE: 97 F | HEIGHT: 57 IN

## 2017-03-16 DIAGNOSIS — R07.89 CHEST WALL PAIN: ICD-10-CM

## 2017-03-16 DIAGNOSIS — R07.89 CHEST WALL PAIN: Primary | ICD-10-CM

## 2017-03-16 DIAGNOSIS — Z23 NEED FOR PROPHYLACTIC VACCINATION AGAINST STREPTOCOCCUS PNEUMONIAE (PNEUMOCOCCUS): ICD-10-CM

## 2017-03-16 PROCEDURE — 71101 X-RAY EXAM UNILAT RIBS/CHEST: CPT | Mod: LT

## 2017-03-16 PROCEDURE — 99213 OFFICE O/P EST LOW 20 MIN: CPT | Performed by: NURSE PRACTITIONER

## 2017-03-16 NOTE — PATIENT INSTRUCTIONS
Based on your medical history and these are the current health maintenance or preventive care services that you are due for (some may have been done at this visit)  Health Maintenance Due   Topic Date Due     DEXA SCAN SCREENING (SYSTEM ASSIGNED)  11/10/2015     PNEUMOCOCCAL (2 of 2 - PPSV23) 11/10/2016         At [facility], we strive to deliver an exceptional experience to you, every time we see you.    If you receive a survey in the mail, please send us back your thoughts. We really do value your feedback.    Your care team's suggested websites for health information:  Www.[employer].org : Up to date and easily searchable information on multiple topics.  Www.medlineplus.gov : medication info, interactive tutorials, watch real surgeries online  Www.familydoctor.org : good info from the Academy of Family Physicians  Www.cdc.gov : public health info, travel advisories, epidemics (H1N1)  Www.aap.org : children's health info, normal development, vaccinations  Www.health.[facility].[place].us : [place] dept of health, public health issues in [place], N1N1    How to contact your care team:   Team [place] [phone]         Pharmacy [phone]    Dr. Dupont, Tawanna Barahona PA-C, Dr. Benavides, Latonia MARK CNP, Marlen [place] PA-C, Dr. Gonzalez, and DEEDEE Ruiz CNP    Team RNs: Clarisa & Emelia      Clinic hours  M-Th 7 am-7 pm   Fri 7 am-5 pm.   Urgent care M-F 11 am-9 pm,   Sat/Sun 9 am-5 pm.  Pharmacy M-Th 8 am-8 pm Fri 8 am-6 pm  Sat/Sun 9 am-5 pm.     All password changes, disabled accounts, or ID changes in [employer]/MyHealth will be done by our Access Services Department.    If you need help with your account or password, call: [phone]. Clinic staff no longer has the ability to change passwords.     Chest Strain    You have a chest strain. This happens when the muscles between the ribs stretch and tear. This may occur when you have a severe cough. It may also happen after  strenuous lifting or twisting injuries of the upper back.  A chest strain usually causes pain when you move or take a deep breath. The strain may take a few days to a few weeks to heal.  Home care  Follow these guidelines when caring for yourself at home:    Rest. Don t do any heavy lifting or strenuous activity. Don t do any activity that causes pain.    If you have a severe cough, use a cough syrup with dextromethorphan, unless another cough medicine was prescribed. If you have high blood pressure, check with your health care provider or pharmacist before using an over-the-counter cough medicine.    You may use acetaminophen or ibuprofen to control pain, unless another medicine was prescribed. If you have chronic liver or kidney disease, talk with your provider before using these medicines. Also talk with your provider if you ve had a stomach ulcer or GI bleeding.  Follow-up care  Follow up with your health care provider, or as advised.  When to seek medical advice  Call your health care provider right away if any of these occur:    A change in the type of pain. This means if it feels different, gets worse, lasts longer, or begins to spread into your shoulder, arm, neck, jaw, or back.    Pain doesn t go away in 1 week    Shortness of breath, difficulty breathing, or fast breathing    Pain gets worse when you breathe    Cough with dark-colored sputum (phlegm) or blood    Weakness, dizziness, or fainting    Fever of 101 F (38.3 C) or higher, or as directed by your health care provider     3617-7249 [employer]. [address] [place] [address], [place] [id number]. All rights reserved. This information is not intended as a substitute for professional medical care. Always follow your healthcare professional's instructions.

## 2017-03-16 NOTE — PROGRESS NOTES
"  SUBJECTIVE:                                                    Marlon Roberto is a 66 year old female who presents to clinic today for the following health issues:      Rib Pain     Onset:  Friday     Description:   Location: Left ribs   Character: Dull ache    Intensity: moderate    Progression of Symptoms: same    Accompanying Signs & Symptoms:  Other symptoms: none   History:   Previous similar pain: no       Precipitating factors:   Trauma or overuse: YES- Pt believed she injured herself trying to get on the bed, she fell on her hand which went into her ribs.    Alleviating factors:  Improved by: nothing       Therapies Tried and outcome: None  Patient fell in to her bed 6 days ago.  Her left hand was under her left breast when she landed and she now has pain under her left breast, pain is worse with taking a deep breath but not with dressing or movement,denies bruising, chest pain, palpitations, cough, hemoptysis.      Problem list and histories reviewed & adjusted, as indicated.  Additional history: as documented    BP Readings from Last 3 Encounters:   03/16/17 124/78   01/04/17 131/75   11/30/16 109/61    Wt Readings from Last 3 Encounters:   03/16/17 172 lb (78 kg)   01/04/17 172 lb (78 kg)   11/30/16 170 lb (77.1 kg)                  Labs reviewed in EPIC    Reviewed and updated as needed this visit by clinical staff       Reviewed and updated as needed this visit by Provider         ROS:  Constitutional, HEENT, cardiovascular, pulmonary, gi and gu systems are negative, except as otherwise noted.    OBJECTIVE:                                                    /78 (BP Location: Left arm, Patient Position: Chair, Cuff Size: Adult Regular)  Pulse 75  Temp 97  F (36.1  C) (Oral)  Ht 4' 9\" (1.448 m)  Wt 172 lb (78 kg)  SpO2 100%  BMI 37.22 kg/m2  Body mass index is 37.22 kg/(m^2).  GENERAL: healthy, alert and no distress  EYES: Eyes grossly normal to inspection, PERRL and conjunctivae and sclerae " normal  HENT: ear canals and TM's normal, nose and mouth without ulcers or lesions  NECK: no adenopathy, no asymmetry, masses, or scars and thyroid normal to palpation  RESP: lungs clear to auscultation - no rales, rhonchi or wheezes  BREAST: normal without masses, tenderness or nipple discharge and no palpable axillary masses or adenopathy  CV: regular rate and rhythm, normal S1 S2, no S3 or S4, no murmur, click or rub, no peripheral edema and peripheral pulses strong  ABDOMEN: soft, nontender, no hepatosplenomegaly, no masses and bowel sounds normal  MS: no gross musculoskeletal defects noted, no edema  SKIN: no suspicious lesions or rashes  BACK: no CVA tenderness, no paralumbar tenderness  PSYCH: mentation appears normal, affect normal/bright    Diagnostic Test Results:  Xray - No rib fractures by my independent read, lungs are clear, await foraml read gby Radiology.   Order   XR Ribs & Chest Left G/E 3 Views [[id number]] (Order [id number])   Exam Information   Exam Date Exam Time Accession # Performing Department Results    3/16/17  2:55 PM [id number] [facility]    [employer] Interactive Report and InfoRx   View the interactive report   PACS Images   Show images for XR Ribs & Chest Left G/E 3 Views   Study Result   CHEST ONE VIEW AND LEFT RIBS 2 VIEWS 3/16/2017 2:55 PM      HISTORY: Left chest wall pain. History of fall 6 days ago.     COMPARISON: None.         IMPRESSION: No displaced rib fractures are identified. Lungs clear. No  pneumothorax. Aortic calcification.     NITESH GONZALEZ MD       ASSESSMENT/PLAN:                                                        BP Screening:   Last 3 BP Readings:    BP Readings from Last 3 Encounters:   03/16/17 124/78   01/04/17 131/75   11/30/16 109/61       The following was recommended to the patient:  Re-screen BP within a year and recommended lifestyle modifications  BMI:   Estimated body mass index is 37.22 kg/(m^2) as calculated from the  "following:    Height as of this encounter: 4' 9\" (1.448 m).    Weight as of this encounter: 172 lb (78 kg).   Weight management plan: Discussed healthy diet and exercise guidelines and patient will follow up in 6 months in clinic to re-evaluate.      1. Chest wall pain  Chest film is negative for rib fracture.  Supportive treatment for now with ice, tylenol prn.    2. Need for prophylactic vaccination against Streptococcus pneumoniae (pneumococcus)    - PNEUMOVOCCAL VACCINE 23 VALENT (PNEUMOVAX 23)    See Patient Instructions    DEEDEE Norris [facility]    "

## 2017-03-16 NOTE — MR AVS SNAPSHOT
After Visit Summary   3/16/2017    Marlon Roberto    MRN: [id number]           Patient Information     Date Of Birth          1950        Visit Information        Provider Department      3/16/2017 2:00 PM Erica Alonzo APRN CNP [facility]        Today's Diagnoses     Chest wall pain    -  1    Need for prophylactic vaccination against Streptococcus pneumoniae (pneumococcus)          Care Instructions    Based on your medical history and these are the current health maintenance or preventive care services that you are due for (some may have been done at this visit)  Health Maintenance Due   Topic Date Due     DEXA SCAN SCREENING (SYSTEM ASSIGNED)  11/10/2015     PNEUMOCOCCAL (2 of 2 - PPSV23) 11/10/2016         At [facility], we strive to deliver an exceptional experience to you, every time we see you.    If you receive a survey in the mail, please send us back your thoughts. We really do value your feedback.    Your care team's suggested websites for health information:  Www.Punchd.[employer] : Up to date and easily searchable information on multiple topics.  Www.medlineplus.gov : medication info, interactive tutorials, watch real surgeries online  Www.familydoctor.org : good info from the Academy of Family Physicians  Www.cdc.gov : public health info, travel advisories, epidemics (H1N1)  Www.aap.org : children's health info, normal development, vaccinations  Www.health.state.[place] : [place] dept of health, public health issues in [place], N1N1    How to contact your care team:   Team Michelle/Spirit [phone]         Pharmacy [phone]    Dr. Dupont, Tawanna Barahona PA-C, Latonia Pang CNP, Marlen White PA-C, Dr. Gonzalez, and DEEDEE Ruiz CNP    Team RNs: Clarisa & Emelia      Clinic hours  M-Th 7 am-7 pm   Fri 7 am-5 pm.   Urgent care M-F 11 am-9 pm,   Sat/Sun 9 am-5 pm.  Pharmacy M-Th 8 am-8 pm Fri 8 am-6 pm  Sat/Sun  9 am-5 pm.     All password changes, disabled accounts, or ID changes in Opbeat/MyHealth will be done by our Access Services Department.    If you need help with your account or password, call: [phone]. Clinic staff no longer has the ability to change passwords.     Chest Strain    You have a chest strain. This happens when the muscles between the ribs stretch and tear. This may occur when you have a severe cough. It may also happen after strenuous lifting or twisting injuries of the upper back.  A chest strain usually causes pain when you move or take a deep breath. The strain may take a few days to a few weeks to heal.  Home care  Follow these guidelines when caring for yourself at home:    Rest. Don t do any heavy lifting or strenuous activity. Don t do any activity that causes pain.    If you have a severe cough, use a cough syrup with dextromethorphan, unless another cough medicine was prescribed. If you have high blood pressure, check with your health care provider or pharmacist before using an over-the-counter cough medicine.    You may use acetaminophen or ibuprofen to control pain, unless another medicine was prescribed. If you have chronic liver or kidney disease, talk with your provider before using these medicines. Also talk with your provider if you ve had a stomach ulcer or GI bleeding.  Follow-up care  Follow up with your health care provider, or as advised.  When to seek medical advice  Call your health care provider right away if any of these occur:    A change in the type of pain. This means if it feels different, gets worse, lasts longer, or begins to spread into your shoulder, arm, neck, jaw, or back.    Pain doesn t go away in 1 week    Shortness of breath, difficulty breathing, or fast breathing    Pain gets worse when you breathe    Cough with dark-colored sputum (phlegm) or blood    Weakness, dizziness, or fainting    Fever of 101 F (38.3 C) or higher, or as directed by your health  "care provider     9058-9752 [employer]. [address] [place] [address], [place] [address]. All rights reserved. This information is not intended as a substitute for professional medical care. Always follow your healthcare professional's instructions.              Follow-ups after your visit        Your next 10 appointments already scheduled     Mar 28, 2017  1:00 PM CDT   Return Visit with Adelaida Romero PT   [facility] ([facility])    [address] [id number] [phone]            May 03, 2017 11:00 AM CDT   Return Visit with Bert Coronado MD   [facility] ([facility])    [address] [id number] [phone]              Who to contact     If you have questions or need follow up information about today's clinic visit or your schedule please contact [facility] directly at [phone].  Normal or non-critical lab and imaging results will be communicated to you by TITIN Techhart, letter or phone within 4 business days after the clinic has received the results. If you do not hear from us within 7 days, please contact the clinic through TITIN Techhart or phone. If you have a critical or abnormal lab result, we will notify you by phone as soon as possible.  Submit refill requests through eTect or call your pharmacy and they will forward the refill request to us. Please allow 3 business days for your refill to be completed.          Additional Information About Your Visit        eTect Information     eTect lets you send messages to your doctor, view your test results, renew your prescriptions, schedule appointments and more. To sign up, go to [email or web address] . Click on \"Log in\" on the left side of the screen, which will take you to the Welcome page. Then click on \"Sign up Now\" on the right side of the page.     You will be asked to enter the access code listed " "below, as well as some personal information. Please follow the directions to create your username and password.     Your access code is: [email or web address]  Expires: 2017  3:41 PM     Your access code will  in 90 days. If you need help or a new code, please call your [facility] or [phone].        Care EveryWhere ID     This is your Care EveryWhere ID. This could be used by other organizations to access your [facility] medical records  [id number]        Your Vitals Were     Pulse Temperature Height Pulse Oximetry BMI (Body Mass Index)       75 97  F (36.1  C) (Oral) 4' 9\" (1.448 m) 100% 37.22 kg/m2        Blood Pressure from Last 3 Encounters:   17 124/78   17 131/75   16 109/61    Weight from Last 3 Encounters:   17 172 lb (78 kg)   17 172 lb (78 kg)   16 170 lb (77.1 kg)              We Performed the Following     PNEUMOVOCCAL VACCINE 23 VALENT (PNEUMOVAX 23)        Primary Care Provider Office Phone # Fax #    Lambert Moss [phone]       [facility] [address] [facility] [address]        Thank you!     Thank you for choosing [facility]  for your care. Our goal is always to provide you with excellent care. Hearing back from our patients is one way we can continue to improve our services. Please take a few minutes to complete the written survey that you may receive in the mail after your visit with us. Thank you!             Your Updated Medication List - Protect others around you: Learn how to safely use, store and throw away your medicines at www.disposemymeds.org.          This list is accurate as of: 3/16/17  3:41 PM.  Always use your most recent med list.                   Brand Name Dispense Instructions for use    amoxicillin-clavulanate 875-125 MG per tablet    AUGMENTIN    14 tablet    Take 1 tablet by mouth 2 times daily for 7 days for possible sinus infection.       aspirin 325 MG EC tablet "      1 tablet daily*       cetirizine 10 MG tablet    zyrTEC    365 tablet    Take 1 tablet (10 mg) by mouth 2 times daily as needed for allergies (itching)       diazepam 2 MG tablet    VALIUM    10 tablet    Take 0.5-2 tablets (1-4 mg) by mouth once as needed for anxiety or agitation (including before flying)       gabapentin 100 MG capsule    NEURONTIN    180 capsule    Take two tablets three times per day       ibuprofen 800 MG tablet    ADVIL/MOTRIN    90 tablet    TAKE 1 TABLET BY MOUTH 3 TIMES DAILY WITH MEALS AS NEEDED FOR JOINT/BACK PAIN       methocarbamol 500 MG tablet    ROBAXIN    30 tablet    Take 2 tablets (1,000 mg) by mouth 3 times daily as needed for muscle spasms       metoprolol 50 MG 24 hr tablet    TOPROL-XL    90 tablet    TAKE 1 TABLET (50 MG) BY MOUTH DAILY FOR BLOOD PRESSURE.       order for DME     1 Device    Equipment being ordered: TEDS socks, closed toe calf high.       PARoxetine 20 MG tablet    PAXIL    30 tablet    Take 1 tablet (20 mg) by mouth At Bedtime for anxiety prevention.       * pravastatin 20 MG tablet    PRAVACHOL    180 tablet    Take 2 tablets (40 mg) by mouth every evening for cholesterol.       * pravastatin 40 MG tablet    PRAVACHOL    90 tablet    TAKE ONE TABLET BY MOUTH DAILY IN THE EVENING .       triamterene-hydrochlorothiazide 37.5-25 MG per tablet    MAXZIDE-25    90 tablet    TAKE ONE TABLET BY MOUTH DAILY       TYLENOL PO          * Notice:  This list has 2 medication(s) that are the same as other medications prescribed for you. Read the directions carefully, and ask your doctor or other care provider to review them with you.

## 2017-03-16 NOTE — NURSING NOTE
"Chief Complaint   Patient presents with     Rib Pain       Initial /78 (BP Location: Left arm, Patient Position: Chair, Cuff Size: Adult Regular)  Pulse 75  Temp 97  F (36.1  C) (Oral)  Ht 4' 9\" (1.448 m)  Wt 172 lb (78 kg)  SpO2 100%  BMI 37.22 kg/m2 Estimated body mass index is 37.22 kg/(m^2) as calculated from the following:    Height as of this encounter: 4' 9\" (1.448 m).    Weight as of this encounter: 172 lb (78 kg).  Medication Reconciliation: complete   Ally Mercado MA      "

## 2017-03-28 ENCOUNTER — OFFICE VISIT (OUTPATIENT)
Dept: PALLIATIVE MEDICINE | Facility: CLINIC | Age: 67
End: 2017-03-28
Payer: COMMERCIAL

## 2017-03-28 DIAGNOSIS — M54.50 CHRONIC LOW BACK PAIN: ICD-10-CM

## 2017-03-28 DIAGNOSIS — M54.12 CERVICAL RADICULOPATHY: ICD-10-CM

## 2017-03-28 DIAGNOSIS — M25.512 CHRONIC LEFT SHOULDER PAIN: ICD-10-CM

## 2017-03-28 DIAGNOSIS — G89.4 CHRONIC PAIN SYNDROME: Primary | ICD-10-CM

## 2017-03-28 DIAGNOSIS — M54.16 LUMBAR RADICULOPATHY: ICD-10-CM

## 2017-03-28 DIAGNOSIS — G89.29 CHRONIC LOW BACK PAIN: ICD-10-CM

## 2017-03-28 DIAGNOSIS — G89.29 CHRONIC LEFT SHOULDER PAIN: ICD-10-CM

## 2017-03-28 PROCEDURE — 97110 THERAPEUTIC EXERCISES: CPT | Mod: GP | Performed by: PHYSICAL THERAPIST

## 2017-03-28 NOTE — PROGRESS NOTES
Pain Physical Therapy Progress Note    Patient Name: Marlon Roberto     YOB: 1950     Medical Record Number: [id number]    Visits: 2/10    Last seen 1/23/17    Diagnosis:    Chronic pain syndrome  Chronic low back pain  Chronic left shoulder pain  Lumbar radiculopathy  Cervical radiculopathy    Subjective: Patient reports that she is still having L shoulder pain.  Has limited movements; difficulty doing hair, reaching overhead, etc.  States that she has been trying to use the L shoulder.  Did have recent fall--rib pain; no fractures.    Did get a letter regarding MRI results for L shoulder, was recommended to have follow up with ortho/sports med.    Still on light duty at work; don't bend, pull; lift;got the restrictions from Dr. Cruz    Has increased shoulder and back pain on working days    Self Care  HEP: NA  Walking/Aerobic Activity: has been trying to do a little bit of walking on non working days  Heat/Ice: uses both cold and heat, likes the heat better  Posture: NA  Mini Breaks: NA  Breathing/Relaxation: NA  Pacing: NA    Objective Findings:  OBSERVATION: Guarded posture; slight forward head; rounded shoulders; clenching jaw; poor core engagement with seated posture; upper chest breathing noted; leaning to R in sitting   GAIT & LOCOMOTION: Antalgic; moves slowly; decreased heel to toe gait, slight forward flexed  RANGE OF MOTION: Shoulder: WFL on R; ~ 120 flexion, 90 abduction, pain with movements on L  MUSCLE PERFORMANCE: needs cues for small movements with scapular retraction      Treatment Interventions:  Therapeutic Procedures/Exercises: UBE x 4 mins  Forward/backward; with resting breaks  Instructed in Codman's and scapular retraction; gave handouts  Encouraged patient to continue to work on walking program, doing small activity at a time, progressing little by little  _________________________________________________________________    Instruction on home program:  HEP    Assessment:  Ongoing Functional Limitations Include:  Patient tolerated/responded well to treatment  Impression: patient needs cues for proper technique with shoulder exercises; encouragement for small movements, stated that it felt better after gentle movements, no increase in pain with codman's/UBE    Recommendations: continue, patient with questions re: MRI/L shoulder--recommend patient follow up with previous recommendation to follow up with ortho/sports med    Intensity Level: 1 (1=low intensity; 5=high intensity)    Demonstrates/Verbalizes Technique: 2 (1= poor technique-difficulty performing exercises,significant cues required; 5= good technique-performs exercises without cues)    Body Awareness: 2 (1=low awareness; 5=high awareness)    Posture/Stability: 2 (1= poor posture, stability; 5= good posture, stability)    Motivational Level: Cooperative    Response to Teaching: cooperative and needs reinforcement    Factors that affect learning: None    _______________________________________________________________________  Plan of Care   Continue PT to support reactivation and integration of self regulation pain management skills;  Focus of next session will be on: HEP, posture     Present:  NA     Total Visit Time:  30 minutes; patient needed to leave early  TE: 30 mins;      Therapist: Adelaida Romero PT             Date: 3/28/2017    G Code 2/10

## 2017-03-28 NOTE — MR AVS SNAPSHOT
"              After Visit Summary   3/28/2017    Marlon Roberto    MRN: [id number]           Patient Information     Date Of Birth          1950        Visit Information        Provider Department      3/28/2017 1:00 PM Adelaida Romero, [facility]        Today's Diagnoses     Chronic pain syndrome    -  1    Chronic low back pain        Chronic left shoulder pain        Lumbar radiculopathy        Cervical radiculopathy           Follow-ups after your visit        Your next 10 appointments already scheduled     May 03, 2017 11:00 AM CDT   Return Visit with Bert Coronado MD   [facility] ([facility])    [address]  [place] [address] [phone]              Who to contact     If you have questions or need follow up information about today's clinic visit or your schedule please contact [facility] directly at [phone].  Normal or non-critical lab and imaging results will be communicated to you by MyChart, letter or phone within 4 business days after the clinic has received the results. If you do not hear from us within 7 days, please contact the clinic through Datadoghart or phone. If you have a critical or abnormal lab result, we will notify you by phone as soon as possible.  Submit refill requests through LAST MINUTE NETWORK or call your pharmacy and they will forward the refill request to us. Please allow 3 business days for your refill to be completed.          Additional Information About Your Visit        MyChart Information     LAST MINUTE NETWORK lets you send messages to your doctor, view your test results, renew your prescriptions, schedule appointments and more. To sign up, go to [email or web address] . Click on \"Log in\" on the left side of the screen, which will take you to the Welcome page. Then click on \"Sign up Now\" on the right side of the page.     You will be asked to enter the access code listed below, as well as " some personal information. Please follow the directions to create your username and password.     Your access code is: [email or web address]  Expires: 2017  3:41 PM     Your access code will  in 90 days. If you need help or a new code, please call your [facility] clinic or [phone].        Care EveryWhere ID     This is your Care EveryWhere ID. This could be used by other organizations to access your [facility] medical records  [id number]         Blood Pressure from Last 3 Encounters:   17 124/78   17 131/75   16 109/61    Weight from Last 3 Encounters:   17 78 kg (172 lb)   17 78 kg (172 lb)   16 77.1 kg (170 lb)              We Performed the Following     THERAPEUTIC EXERCISES        Primary Care Provider Office Phone # Fax #    Lambert Moss [phone]       [facility] [address] [facility] [address]        Thank you!     Thank you for choosing [facility]  for your care. Our goal is always to provide you with excellent care. Hearing back from our patients is one way we can continue to improve our services. Please take a few minutes to complete the written survey that you may receive in the mail after your visit with us. Thank you!             Your Updated Medication List - Protect others around you: Learn how to safely use, store and throw away your medicines at www.disposemymeds.org.          This list is accurate as of: 3/28/17  2:43 PM.  Always use your most recent med list.                   Brand Name Dispense Instructions for use    aspirin 325 MG EC tablet      1 tablet daily*       cetirizine 10 MG tablet    zyrTEC    365 tablet    Take 1 tablet (10 mg) by mouth 2 times daily as needed for allergies (itching)       diazepam 2 MG tablet    VALIUM    10 tablet    Take 0.5-2 tablets (1-4 mg) by mouth once as needed for anxiety or agitation (including before flying)       gabapentin 100 MG capsule    NEURONTIN     180 capsule    Take two tablets three times per day       ibuprofen 800 MG tablet    ADVIL/MOTRIN    90 tablet    TAKE 1 TABLET BY MOUTH 3 TIMES DAILY WITH MEALS AS NEEDED FOR JOINT/BACK PAIN       methocarbamol 500 MG tablet    ROBAXIN    30 tablet    Take 2 tablets (1,000 mg) by mouth 3 times daily as needed for muscle spasms       metoprolol 50 MG 24 hr tablet    TOPROL-XL    90 tablet    TAKE 1 TABLET (50 MG) BY MOUTH DAILY FOR BLOOD PRESSURE.       order for DME     1 Device    Equipment being ordered: TEDS socks, closed toe calf high.       PARoxetine 20 MG tablet    PAXIL    30 tablet    Take 1 tablet (20 mg) by mouth At Bedtime for anxiety prevention.       * pravastatin 20 MG tablet    PRAVACHOL    180 tablet    Take 2 tablets (40 mg) by mouth every evening for cholesterol.       * pravastatin 40 MG tablet    PRAVACHOL    90 tablet    TAKE ONE TABLET BY MOUTH DAILY IN THE EVENING .       triamterene-hydrochlorothiazide 37.5-25 MG per tablet    MAXZIDE-25    90 tablet    TAKE ONE TABLET BY MOUTH DAILY       TYLENOL PO          * Notice:  This list has 2 medication(s) that are the same as other medications prescribed for you. Read the directions carefully, and ask your doctor or other care provider to review them with you.

## 2017-04-04 DIAGNOSIS — I10 ESSENTIAL HYPERTENSION WITH GOAL BLOOD PRESSURE LESS THAN 140/90: Primary | ICD-10-CM

## 2017-04-04 NOTE — TELEPHONE ENCOUNTER
metoprolol (TOPROL-XL) 50 MG 24 hr tablet      Last Written Prescription Date: 12/30/16  Last Fill Quantity: 90, # refills: 0    Last Office Visit with [facility], [facility] or [facility] prescribing provider:  03/16/17   Future Office Visit:    Next 5 appointments (look out 90 days)     May 03, 2017 11:00 AM CDT   Return Visit with Bert Coronado MD   [facility] [place] ([facility] [place])    [address]  [place] [address] [phone]                    BP Readings from Last 3 Encounters:   03/16/17 124/78   01/04/17 131/75   11/30/16 109/61         Nikkie Juarez  [facility]

## 2017-04-05 NOTE — TELEPHONE ENCOUNTER
Routing refill request to provider for review/approval because:  Drug interaction warning  Kelli Ceballos RN

## 2017-04-06 ENCOUNTER — TELEPHONE (OUTPATIENT)
Dept: FAMILY MEDICINE | Facility: CLINIC | Age: 67
End: 2017-04-06

## 2017-04-06 DIAGNOSIS — I10 HYPERTENSION GOAL BP (BLOOD PRESSURE) < 140/90: ICD-10-CM

## 2017-04-06 DIAGNOSIS — I10 ESSENTIAL HYPERTENSION WITH GOAL BLOOD PRESSURE LESS THAN 140/90: ICD-10-CM

## 2017-04-06 RX ORDER — METOPROLOL SUCCINATE 50 MG/1
TABLET, EXTENDED RELEASE ORAL
Qty: 90 TABLET | Refills: 0 | Status: SHIPPED | OUTPATIENT
Start: 2017-04-06 | End: 2017-06-29

## 2017-04-06 RX ORDER — METOPROLOL SUCCINATE 50 MG/1
TABLET, EXTENDED RELEASE ORAL
Qty: 90 TABLET | Refills: 0 | Status: CANCELLED | OUTPATIENT
Start: 2017-04-06

## 2017-04-06 RX ORDER — METOPROLOL SUCCINATE 50 MG/1
TABLET, EXTENDED RELEASE ORAL
Qty: 90 TABLET | Refills: 0 | OUTPATIENT
Start: 2017-04-06

## 2017-04-06 NOTE — TELEPHONE ENCOUNTER
Reason for Call:  Medication or medication refill:    Do you use a [facility]?  Name of the pharmacy and phone number for the current request:  No   [id number] [place] [facility], [place] - [address]    Name of the medication requested: metoprolol (TOPROL-XL) 50 MG 24 hr tablet    Other request: Patient states she is completely out    Can we leave a detailed message on this number? YES    Phone number patient can be reached at: Cell number on file:    Telephone Information:   Mobile [phone]       Best Time: Any    Call taken on 4/6/2017 at 4:45 PM by Nicky Lopez

## 2017-04-06 NOTE — TELEPHONE ENCOUNTER
metoprolol (TOPROL-XL) 50 MG 24 hr tablet 90 tablet 0 4/6/2017  No   Sig: TAKE 1 TABLET (50 MG) BY MOUTH ONCE DAILY FOR BLOOD PRESSURE.   Class: E-Prescribe   Order: [id number]   E-Prescribing Status: Receipt confirmed by pharmacy (4/6/2017  8:48 AM CDT)     The prescription above was sent to YEMI Rizzo RN

## 2017-04-06 NOTE — TELEPHONE ENCOUNTER
Per review of chart, a prescription was sent today for Metoprolol for #90 with 0 refills to the below requested pharmacy.    Team - please inform patient of sent refill and advise patient to contact pharmacy to fill medication.  Chloe Moreno RN

## 2017-05-03 ENCOUNTER — OFFICE VISIT (OUTPATIENT)
Dept: FAMILY MEDICINE | Facility: CLINIC | Age: 67
End: 2017-05-03
Payer: COMMERCIAL

## 2017-05-03 VITALS
OXYGEN SATURATION: 98 % | BODY MASS INDEX: 37.32 KG/M2 | RESPIRATION RATE: 16 BRPM | DIASTOLIC BLOOD PRESSURE: 82 MMHG | TEMPERATURE: 97.7 F | HEART RATE: 68 BPM | HEIGHT: 57 IN | WEIGHT: 173 LBS | SYSTOLIC BLOOD PRESSURE: 132 MMHG

## 2017-05-03 DIAGNOSIS — M85.80 OSTEOPENIA: ICD-10-CM

## 2017-05-03 DIAGNOSIS — Z00.00 NORMAL ROUTINE HISTORY AND PHYSICAL EXAMINATION: Primary | ICD-10-CM

## 2017-05-03 DIAGNOSIS — I10 ESSENTIAL HYPERTENSION WITH GOAL BLOOD PRESSURE LESS THAN 140/90: ICD-10-CM

## 2017-05-03 DIAGNOSIS — Z23 NEED FOR 23-POLYVALENT PNEUMOCOCCAL POLYSACCHARIDE VACCINE: ICD-10-CM

## 2017-05-03 DIAGNOSIS — I47.10 PSVT (PAROXYSMAL SUPRAVENTRICULAR TACHYCARDIA) (H): ICD-10-CM

## 2017-05-03 DIAGNOSIS — E78.5 HYPERLIPIDEMIA LDL GOAL <130: ICD-10-CM

## 2017-05-03 LAB
ALT SERPL W P-5'-P-CCNC: 22 U/L (ref 0–50)
CHOLEST SERPL-MCNC: 212 MG/DL
CREAT SERPL-MCNC: 0.86 MG/DL (ref 0.52–1.04)
GFR SERPL CREATININE-BSD FRML MDRD: 66 ML/MIN/1.7M2
HDLC SERPL-MCNC: 97 MG/DL
LDLC SERPL CALC-MCNC: 102 MG/DL
NONHDLC SERPL-MCNC: 115 MG/DL
POTASSIUM SERPL-SCNC: 3.4 MMOL/L (ref 3.4–5.3)
TRIGL SERPL-MCNC: 66 MG/DL

## 2017-05-03 PROCEDURE — 84132 ASSAY OF SERUM POTASSIUM: CPT | Performed by: FAMILY MEDICINE

## 2017-05-03 PROCEDURE — 99397 PER PM REEVAL EST PAT 65+ YR: CPT | Mod: 25 | Performed by: NURSE PRACTITIONER

## 2017-05-03 PROCEDURE — 90732 PPSV23 VACC 2 YRS+ SUBQ/IM: CPT | Performed by: NURSE PRACTITIONER

## 2017-05-03 PROCEDURE — 90471 IMMUNIZATION ADMIN: CPT | Performed by: NURSE PRACTITIONER

## 2017-05-03 PROCEDURE — 84460 ALANINE AMINO (ALT) (SGPT): CPT | Performed by: FAMILY MEDICINE

## 2017-05-03 PROCEDURE — 36415 COLL VENOUS BLD VENIPUNCTURE: CPT | Performed by: FAMILY MEDICINE

## 2017-05-03 PROCEDURE — 80061 LIPID PANEL: CPT | Performed by: FAMILY MEDICINE

## 2017-05-03 PROCEDURE — 82565 ASSAY OF CREATININE: CPT | Performed by: FAMILY MEDICINE

## 2017-05-03 ASSESSMENT — PAIN SCALES - GENERAL: PAINLEVEL: NO PAIN (0)

## 2017-05-03 NOTE — LETTER
[facility]  [address]  [facility] [address]      May 4, 2017      Marlon Roberto  [address]  [facility] [address]              Dear Yovanisalma GONZALEZ Pardeep      Your cholesterol levels a a little higher than usual for you.  I hope you are still taking your pravastatin.  All of the rest of your test results were within the expected range for you.     Enclosed is a copy of the results.  It was a pleasure to see you at your last appointment.    If you have any questions or concerns, please call myself or my nurse at [phone].      Sincerely,      Lambert Moss MD/RYANN Funes MA  [employer]      Results for orders placed or performed in visit on 17   Creatinine   Result Value Ref Range    Creatinine 0.86 0.52 - 1.04 mg/dL    GFR Estimate 66 >60 mL/min/1.7m2    GFR Estimate If Black 80 >60 mL/min/1.7m2   Lipid panel reflex to direct LDL   Result Value Ref Range    Cholesterol 212 (H) <200 mg/dL    Triglycerides 66 <150 mg/dL    HDL Cholesterol 97 >49 mg/dL    LDL Cholesterol Calculated 102 (H) <100 mg/dL    Non HDL Cholesterol 115 <130 mg/dL   Potassium   Result Value Ref Range    Potassium 3.4 3.4 - 5.3 mmol/L   ALT   Result Value Ref Range    ALT 22 0 - 50 U/L                 RE: Marlon Roberto   MRN: [id number]  : 1950  ENC DATE: May 3, 2017

## 2017-05-03 NOTE — NURSING NOTE
"Chief Complaint   Patient presents with     Physical     fasting        Initial /82 (BP Location: Left arm, Patient Position: Chair, Cuff Size: Adult Large)  Pulse 68  Temp 97.7  F (36.5  C) (Oral)  Resp 16  Ht 4' 9\" (1.448 m)  Wt 173 lb (78.5 kg)  SpO2 98%  BMI 37.44 kg/m2 Estimated body mass index is 37.44 kg/(m^2) as calculated from the following:    Height as of this encounter: 4' 9\" (1.448 m).    Weight as of this encounter: 173 lb (78.5 kg).  Medication Reconciliation:     Sapphire Flowers CMA      "

## 2017-05-03 NOTE — PROGRESS NOTES
SUBJECTIVE:                                                            Marlon Roberto is a 66 year old female who presents for Preventive Visit.      Are you in the first 12 months of your Medicare Part B coverage?  No    Healthy Habits:    Do you get at least three servings of calcium containing foods daily (dairy, green leafy vegetables, etc.)? yes    Amount of exercise or daily activities, outside of work: 5 day(s) per week    Problems taking medications regularly No    Medication side effects: Yes pravastatin gets stuck     Have you had an eye exam in the past two years? yes    Do you see a dentist twice per year? yes    Do you have sleep apnea, excessive snoring or daytime drowsiness?no    COGNITIVE SCREEN  1) Repeat 3 items (Banana, Sunrise, Chair)    2) Clock draw: NORMAL  3) 3 item recall: Recalls 3 objects  Results: 3 items recalled: COGNITIVE IMPAIRMENT LESS LIKELY    Mini-CogTM Copyright S Ranjeet. Licensed by the author for use in [facility]; reprinted with permission (jeff@[facility]). All rights reserved.      Reviewed and updated as needed this visit by clinical staff  Tobacco  Allergies  Meds     Patient has no concerns for her health today.    Reviewed and updated as needed this visit by Provider        Social History   Substance Use Topics     Smoking status: Never Smoker     Smokeless tobacco: Never Used     Alcohol use No      Comment: 0-1 drink weekly       occasionally - wine     Today's PHQ-2 Score:   PHQ-2 ( 1999 Pf[employer]) 10/17/2016 5/11/2016   Q1: Little interest or pleasure in doing things 0 0   Q2: Feeling down, depressed or hopeless 1 0   PHQ-2 Score 1 0       Do you feel safe in your environment - yes     Do you have a Health Care Directive?: No: Advance care planning reviewed with patient; information given to patient to review.    Current providers sharing in care for this patient include:   Patient Care Team:  Lambert Moss MD as PCP - General      [facility]  "impairment: No    Ability to successfully perform activities of daily living: Yes, no assistance needed     Fall risk: no concerns     Home safety:  none identified  click delete button to remove this line now    The following health maintenance items are reviewed in Epic and correct as of today:  Health Maintenance   Topic Date Due     DEXA SCAN SCREENING (SYSTEM ASSIGNED)  11/10/2015     PNEUMOCOCCAL (2 of 2 - PPSV23) 11/10/2016     INFLUENZA VACCINE (SYSTEM ASSIGNED)  09/01/2017     MICHELLE QUESTIONNAIRE 1 YEAR  10/17/2017     PHQ-9 Q1YR (NO INBASKET)  11/30/2017     FALL RISK ASSESSMENT  03/16/2018     MAMMO SCREEN Q2 YR (SYSTEM ASSIGNED)  11/15/2018     LIPID SCREEN Q5 YR FEMALE (SYSTEM ASSIGNED)  05/11/2021     TETANUS IMMUNIZATION (SYSTEM ASSIGNED)  07/26/2021     ADVANCE DIRECTIVE PLANNING Q5 YRS (NO INBASKET)  08/11/2021     COLON CANCER SCREEN (SYSTEM ASSIGNED)  11/28/2026     HEPATITIS C SCREENING  Completed         Pneumonia Vaccine:Adults age 65+ who have not received previous Pneumovax (PPSV23) or PCV13 as an adult: Should first be given PCV13 AND then should be given PPSV23 6-12 months after PCV13     ROS:  Constitutional, HEENT, cardiovascular, pulmonary, GI, , musculoskeletal, neuro, skin, endocrine and psych systems are negative, except as otherwise noted.    Problem list, Medication list, Allergies, and Medical/Social/Surgical histories reviewed in [facility] and updated as appropriate.  Labs reviewed in EPIC  BP Readings from Last 3 Encounters:   05/03/17 132/82   03/16/17 124/78   01/04/17 131/75    Wt Readings from Last 3 Encounters:   05/03/17 173 lb (78.5 kg)   03/16/17 172 lb (78 kg)   01/04/17 172 lb (78 kg)                  OBJECTIVE:                                                            /82 (BP Location: Left arm, Patient Position: Chair, Cuff Size: Adult Large)  Pulse 68  Temp 97.7  F (36.5  C) (Oral)  Resp 16  Ht 4' 9\" (1.448 m)  Wt 173 lb (78.5 kg)  SpO2 98%  BMI 37.44 kg/m2 " "Estimated body mass index is 37.44 kg/(m^2) as calculated from the following:    Height as of this encounter: 4' 9\" (1.448 m).    Weight as of this encounter: 173 lb (78.5 kg).  EXAM:   GENERAL: healthy, alert and no distress  EYES: Eyes grossly normal to inspection, PERRL and conjunctivae and sclerae normal  HENT: ear canals and TM's normal, nose and mouth without ulcers or lesions  NECK: no adenopathy, no asymmetry, masses, or scars and thyroid normal to palpation  RESP: lungs clear to auscultation - no rales, rhonchi or wheezes  BREAST: normal without masses, tenderness or nipple discharge and no palpable axillary masses or adenopathy  CV: regular rate and rhythm, normal S1 S2, no S3 or S4, no murmur, click or rub, no peripheral edema and peripheral pulses strong  ABDOMEN: soft, nontender, no hepatosplenomegaly, no masses and bowel sounds normal   (female): deferred  MS: no gross musculoskeletal defects noted, no edema  SKIN: no suspicious lesions or rashes  NEURO: Normal strength and tone, mentation intact and speech normal  BACK: no CVA tenderness, no paralumbar tenderness  PSYCH: mentation appears normal, affect normal/bright  LYMPH: no cervical, supraclavicular, axillary, or inguinal adenopathy    ASSESSMENT / PLAN:                                                            1. Normal routine history and physical examination      2. Osteopenia  DEXA scan has been ordered, encouraged her to schedule appt to have this done (interval study).    3. Obesity, Class II, BMI 35-39.9, with comorbidity (H)  Benefits of weight loss reviewed in detail, encouraged her to cut back on the carbohydrates in the diet, consume more fruits and vegetables, drink plenty of water, avoid fruit juices, sodas, get 150 min moderate exercise/week.  Recheck weight in 6 months.    4.  PSVT (paroxysmal supraventriculat tachycardia)  Rate controlled, symptoms well controlled,  No need for Cardiology referral at this time.  Echocardiogram " "in 2014 was normal with no new symptoms now.    5. Hyperlipidemia LDL goal <130  -Lipid panel    6.  Need for 23 polyvalent pneumococcal polysaccharide vaccine  -PSV-23    7.  (H)Essential hypertension with goal blood pressure less than 140/90  BP in good control today, continue with  DASH diet, regular exercise, work on weight loss, continue present medications.    End of Life Planning:  Patient currently has an advanced directive: No.  I have verified the patient's ablity to prepare an advanced directive/make health care decisions.  Literature was provided to assist patient in preparing an advanced directive.    COUNSELING:  Reviewed preventive health counseling, as reflected in patient instructions       Regular exercise       Healthy diet/nutrition       Vision screening       Dental care       Vaccinated for: Pneumococcal       Osteoporosis Prevention/Bone Health       Advanced Planning         Estimated body mass index is 37.44 kg/(m^2) as calculated from the following:    Height as of this encounter: 4' 9\" (1.448 m).    Weight as of this encounter: 173 lb (78.5 kg).  Weight management plan: Discussed healthy diet and exercise guidelines and patient will follow up in 12 months in clinic to re-evaluate.   reports that she has never smoked. She has never used smokeless tobacco.      Appropriate preventive services were discussed with this patient, including applicable screening as appropriate for cardiovascular disease, diabetes, osteopenia/osteoporosis, and glaucoma.  As appropriate for age/gender, discussed screening for colorectal cancer, prostate cancer, breast cancer, and cervical cancer. Checklist reviewing preventive services available has been given to the patient.    Reviewed patients plan of care and provided an AVS. The Basic Care Plan (routine screening as documented in Health Maintenance) for Marlon meets the Care Plan requirement. This Care Plan has been established and reviewed with the " Patient.    Counseling Resources:  ATP IV Guidelines  Pooled Cohorts Equation Calculator  Breast Cancer Risk Calculator  FRAX Risk Assessment  ICSI Preventive Guidelines  Dietary Guidelines for Americans, 2010  USDA's MyPlate  ASA Prophylaxis  Lung CA Screening      After encounter was over and provider was ready to leave the room, patient asked for Cardiology referral (she has history fo PSVE which is well controlled on Torprol- XL, denies cp, palpitations, shortness of breath, juarez, PND.  She had normal echocardiogram 2014.)   She also requested Neurology referral- states she has tremor of hands at times.  I advised her to schedule a follow up appt to discuss this further.    DEEDEE Norris CNP  [facility]

## 2017-05-03 NOTE — MR AVS SNAPSHOT
After Visit Summary   5/3/2017    Marlon Roberto    MRN: [id number]           Patient Information     Date Of Birth          1950        Visit Information        Provider Department      5/3/2017 9:40 AM Erica Alonzo APRN [facility]        Today's Diagnoses     Normal routine history and physical examination    -  1    Osteopenia        Obesity, Class II, BMI 35-39.9, with comorbidity (H)        Essential hypertension with goal blood pressure less than 140/90        Need for 23-polyvalent pneumococcal polysaccharide vaccine          Care Instructions      Preventive Health Recommendations    Female Ages 65 +    Yearly exam:     See your health care provider every year in order to  o Review health changes.   o Discuss preventive care.    o Review your medicines if your doctor has prescribed any.      You no longer need a yearly Pap test unless you've had an abnormal Pap test in the past 10 years. If you have vaginal symptoms, such as bleeding or discharge, be sure to talk with your provider about a Pap test.      Every 1 to 2 years, have a mammogram.  If you are over 69, talk with your health care provider about whether or not you want to continue having screening mammograms.      Every 10 years, have a colonoscopy. Or, have a yearly FIT test (stool test). These exams will check for colon cancer.       Have a cholesterol test every 5 years, or more often if your doctor advises it.       Have a diabetes test (fasting glucose) every three years. If you are at risk for diabetes, you should have this test more often.       At age 65, have a bone density scan (DEXA) to check for osteoporosis (brittle bone disease).    Shots:    Get a flu shot each year.    Get a tetanus shot every 10 years.    Talk to your doctor about your pneumonia vaccines. There are now two you should receive - Pneumovax (PPSV 23) and Prevnar (PCV 13).    Talk to your doctor about the shingles  "vaccine.    Talk to your doctor about the hepatitis B vaccine.    Nutrition:     Eat at least 5 servings of fruits and vegetables each day.      Eat whole-grain bread, whole-wheat pasta and brown rice instead of white grains and rice.      Talk to your provider about Calcium and Vitamin D.     Lifestyle    Exercise at least 150 minutes a week (30 minutes a day, 5 days a week). This will help you control your weight and prevent disease.      Limit alcohol to one drink per day.      No smoking.       Wear sunscreen to prevent skin cancer.       See your dentist twice a year for an exam and cleaning.      See your eye doctor every 1 to 2 years to screen for conditions such as glaucoma, macular degeneration and cataracts.        Follow-ups after your visit        Who to contact     If you have questions or need follow up information about today's clinic visit or your schedule please contact [facility] directly at [phone].  Normal or non-critical lab and imaging results will be communicated to you by [employer], letter or phone within 4 business days after the clinic has received the results. If you do not hear from us within 7 days, please contact the clinic through [employer] or phone. If you have a critical or abnormal lab result, we will notify you by phone as soon as possible.  Submit refill requests through [employer] or call your pharmacy and they will forward the refill request to us. Please allow 3 business days for your refill to be completed.          Additional Information About Your Visit        [employer] Information     [employer] lets you send messages to your doctor, view your test results, renew your prescriptions, schedule appointments and more. To sign up, go to [email or web address] . Click on \"Log in\" on the left side of the screen, which will take you to the Welcome page. Then click on \"Sign up Now\" on the right side of the page.     You will be asked to enter the access code listed " "below, as well as some personal information. Please follow the directions to create your username and password.     Your access code is: [email or web address]  Expires: 2017  3:41 PM     Your access code will  in 90 days. If you need help or a new code, please call your [facility] or [phone].        Care EveryWhere ID     This is your Care EveryWhere ID. This could be used by other organizations to access your [place] medical records  [id number]        Your Vitals Were     Pulse Temperature Respirations Height Pulse Oximetry BMI (Body Mass Index)    68 97.7  F (36.5  C) (Oral) 16 4' 9\" (1.448 m) 98% 37.44 kg/m2       Blood Pressure from Last 3 Encounters:   17 132/82   17 124/78   17 131/75    Weight from Last 3 Encounters:   17 173 lb (78.5 kg)   17 172 lb (78 kg)   17 172 lb (78 kg)              We Performed the Following     ADMIN MEDICARE: Pneumococcal Vaccine ()     Pneumococcal vaccine 23 valent PPSV23  (Pneumovax) [74893]          Today's Medication Changes          These changes are accurate as of: 5/3/17 10:38 AM.  If you have any questions, ask your nurse or doctor.               These medicines have changed or have updated prescriptions.        Dose/Directions    pravastatin 40 MG tablet   Commonly known as:  PRAVACHOL   This may have changed:  Another medication with the same name was removed. Continue taking this medication, and follow the directions you see here.   Used for:  Hyperlipidemia LDL goal <130   Changed by:  Lambert Moss MD        TAKE ONE TABLET BY MOUTH DAILY IN THE EVENING .   Quantity:  90 tablet   Refills:  1                Primary Care Provider Office Phone # Fax #    Lambert Moss [phone]       [facility] [address]  [facility] [address]        Thank you!     Thank you for choosing [facility]  for your care. Our goal is always to provide you with excellent " care. Hearing back from our patients is one way we can continue to improve our services. Please take a few minutes to complete the written survey that you may receive in the mail after your visit with us. Thank you!             Your Updated Medication List - Protect others around you: Learn how to safely use, store and throw away your medicines at www.disposemymeds.org.          This list is accurate as of: 5/3/17 10:38 AM.  Always use your most recent med list.                   Brand Name Dispense Instructions for use    aspirin 325 MG EC tablet      1 tablet daily*       cetirizine 10 MG tablet    zyrTEC    365 tablet    Take 1 tablet (10 mg) by mouth 2 times daily as needed for allergies (itching)       diazepam 2 MG tablet    VALIUM    10 tablet    Take 0.5-2 tablets (1-4 mg) by mouth once as needed for anxiety or agitation (including before flying)       gabapentin 100 MG capsule    NEURONTIN    180 capsule    Take two tablets three times per day       ibuprofen 800 MG tablet    ADVIL/MOTRIN    90 tablet    TAKE 1 TABLET BY MOUTH 3 TIMES DAILY WITH MEALS AS NEEDED FOR JOINT/BACK PAIN       methocarbamol 500 MG tablet    ROBAXIN    30 tablet    Take 2 tablets (1,000 mg) by mouth 3 times daily as needed for muscle spasms       metoprolol 50 MG 24 hr tablet    TOPROL-XL    90 tablet    TAKE 1 TABLET (50 MG) BY MOUTH ONCE DAILY FOR BLOOD PRESSURE.       order for DME     1 Device    Equipment being ordered: TEDS socks, closed toe calf high.       PARoxetine 20 MG tablet    PAXIL    30 tablet    Take 1 tablet (20 mg) by mouth At Bedtime for anxiety prevention.       pravastatin 40 MG tablet    PRAVACHOL    90 tablet    TAKE ONE TABLET BY MOUTH DAILY IN THE EVENING .       triamterene-hydrochlorothiazide 37.5-25 MG per tablet    MAXZIDE-25    90 tablet    TAKE ONE TABLET BY MOUTH DAILY       TYLENOL PO

## 2017-05-09 ENCOUNTER — TELEPHONE (OUTPATIENT)
Dept: FAMILY MEDICINE | Facility: CLINIC | Age: 67
End: 2017-05-09

## 2017-05-09 DIAGNOSIS — Z13.1 SCREENING FOR DIABETES MELLITUS: ICD-10-CM

## 2017-05-09 DIAGNOSIS — E66.812 OBESITY, CLASS II, BMI 35-39.9: ICD-10-CM

## 2017-05-09 DIAGNOSIS — Z13.1 SCREENING FOR DIABETES MELLITUS: Primary | ICD-10-CM

## 2017-05-09 NOTE — TELEPHONE ENCOUNTER
Patient's blood sugar was not checked on 05/03/17.      This writer attempted to contact [facility] on 05/09/17    Reason for call lab and left detailed message.    If patient calls back, please leave message if patient would like to have her blood sugar checked.        Kelli Ceballos RN

## 2017-05-09 NOTE — TELEPHONE ENCOUNTER
Reason for Call:  Other returning call    Detailed comments: patient is returning the call. She states she would like to get her blood sugar level tested.     Phone Number Patient can be reached at: Cell number on file:    Telephone Information:   Mobile [phone]       Best Time: Anytime     Can we leave a detailed message on this number? YES    Call taken on 5/9/2017 at 11:35 AM by Ned Butcher

## 2017-05-09 NOTE — TELEPHONE ENCOUNTER
Reason for Call:  Other call back    Detailed comments: Patient is requesting a call back from nurse. Patient would like her blood sugar results. Patient states she received her results in the mail but she doesn't see her blood sugar level. Please call and advise. Thanks.     Phone Number Patient can be reached at: Home number on file [phone] (home)    Best Time: Anytime     Can we leave a detailed message on this number? YES    Call taken on 5/9/2017 at 9:49 AM by Ned Butcher

## 2017-05-10 NOTE — TELEPHONE ENCOUNTER
Unable to add to last weeks blood draw per lab, please call the patient and have her come in for a lab only appointment, thanks.    SINAN Alston, Clinical RN [facility].

## 2017-05-11 NOTE — TELEPHONE ENCOUNTER
This writer attempted to contact Marlon on 05/11/17    Reason for call inform patient she will need to come back for a lab appointment. Left detailed message.    When patient calls back, please Relay message, (read verbatim) .        Tristin Funes MA

## 2017-05-24 DIAGNOSIS — I10 ESSENTIAL HYPERTENSION, BENIGN: ICD-10-CM

## 2017-05-24 RX ORDER — TRIAMTERENE/HYDROCHLOROTHIAZID 37.5-25 MG
TABLET ORAL
Qty: 90 TABLET | Refills: 2 | Status: SHIPPED | OUTPATIENT
Start: 2017-05-24 | End: 2018-01-08

## 2017-05-24 NOTE — TELEPHONE ENCOUNTER
triamterene-hydrochlorothiazide (MAXZIDE-25) 37.5-25 MG per tablet      Last Written Prescription Date: 11/22/16  Last Fill Quantity: 90, # refills: 1  Last Office Visit with [facility], [facility] or [facility] prescribing provider: 5/3/17       Potassium   Date Value Ref Range Status   05/03/2017 3.4 3.4 - 5.3 mmol/L Final     Creatinine   Date Value Ref Range Status   05/03/2017 0.86 0.52 - 1.04 mg/dL Final     BP Readings from Last 3 Encounters:   05/03/17 132/82   03/16/17 124/78   01/04/17 131/75           Tono Faarax  [facility]

## 2017-05-24 NOTE — TELEPHONE ENCOUNTER
Prescription approved per [facility] Refill Protocol.  Kelli Ceballos RN

## 2017-06-29 DIAGNOSIS — I10 ESSENTIAL HYPERTENSION WITH GOAL BLOOD PRESSURE LESS THAN 140/90: ICD-10-CM

## 2017-06-29 NOTE — TELEPHONE ENCOUNTER
metoprolol (TOPROL-XL) 50 MG 24 hr tablet      Last Written Prescription Date: 04/06/17  Last Fill Quantity: 90, # refills: 0    Last Office Visit with [facility], [facility] or [facility] prescribing provider:  05/03/17   Future Office Visit:        BP Readings from Last 3 Encounters:   05/03/17 132/82   03/16/17 124/78   01/04/17 131/75         Nikkie [facility]

## 2017-07-06 RX ORDER — METOPROLOL SUCCINATE 50 MG/1
TABLET, EXTENDED RELEASE ORAL
Qty: 90 TABLET | Refills: 3 | Status: SHIPPED | OUTPATIENT
Start: 2017-07-06

## 2017-07-14 DIAGNOSIS — E78.5 HYPERLIPIDEMIA LDL GOAL <130: ICD-10-CM

## 2017-07-14 NOTE — TELEPHONE ENCOUNTER
pravastatin (PRAVACHOL) 40 MG tablet     Last Written Prescription Date: 12/22/16  Last Fill Quantity: 90, # refills: 1  Last Office Visit with [facility], [facility] or [facility] prescribing provider: 5/3/17       Lab Results   Component Value Date    CHOL 212 05/03/2017     Lab Results   Component Value Date    HDL 97 05/03/2017     Lab Results   Component Value Date     05/03/2017     Lab Results   Component Value Date    TRIG 66 05/03/2017     Lab Results   Component Value Date    CHOLHDLRATIO 2.5 11/10/2015           Naty Moreland  [facility]

## 2017-07-17 RX ORDER — PRAVASTATIN SODIUM 40 MG
TABLET ORAL
Qty: 90 TABLET | Refills: 2 | Status: SHIPPED | OUTPATIENT
Start: 2017-07-17

## 2017-07-17 NOTE — TELEPHONE ENCOUNTER
Prescription approved per [facility] Refill Protocol.  Kelli Ceballos RN

## 2017-08-28 DIAGNOSIS — M48.061 SPINAL STENOSIS OF LUMBAR REGION WITHOUT NEUROGENIC CLAUDICATION: ICD-10-CM

## 2017-08-28 RX ORDER — IBUPROFEN 800 MG/1
TABLET, FILM COATED ORAL
Qty: 90 TABLET | Refills: 3 | Status: SHIPPED | OUTPATIENT
Start: 2017-08-28

## 2017-08-28 NOTE — TELEPHONE ENCOUNTER
Received fax request from [facility] pharmacy requesting refill(s) for ibuprofen (ADVIL/MOTRIN) 800 MG tablet    Last refilled on 06/29/17    Pt last seen on 01/04/17  Next appt scheduled for : none    Will facilitate refill.

## 2017-08-28 NOTE — TELEPHONE ENCOUNTER
Signed Prescriptions:                        Disp   Refills    ibuprofen (ADVIL/MOTRIN) 800 MG tablet     90 tab*3        Sig: TAKE 1 TABLET BY MOUTH 3 TIMES DAILY WITH MEALS AS           NEEDED FOR JOINT/BACK PAIN  Authorizing Provider: BERT JIMENEZ    Reviewed, signed, e-prescribed.    Bert Coronado MD  [facility]

## 2017-10-09 ENCOUNTER — TELEPHONE (OUTPATIENT)
Dept: FAMILY MEDICINE | Facility: CLINIC | Age: 67
End: 2017-10-09

## 2017-10-09 DIAGNOSIS — I10 ESSENTIAL HYPERTENSION, BENIGN: ICD-10-CM

## 2017-10-09 DIAGNOSIS — M48.061 SPINAL STENOSIS OF LUMBAR REGION WITHOUT NEUROGENIC CLAUDICATION: ICD-10-CM

## 2017-10-09 DIAGNOSIS — E78.5 HYPERLIPIDEMIA LDL GOAL <130: ICD-10-CM

## 2017-10-09 DIAGNOSIS — I10 ESSENTIAL HYPERTENSION WITH GOAL BLOOD PRESSURE LESS THAN 140/90: ICD-10-CM

## 2017-10-09 RX ORDER — METOPROLOL SUCCINATE 50 MG/1
TABLET, EXTENDED RELEASE ORAL
Qty: 90 TABLET | Refills: 3 | Status: CANCELLED | OUTPATIENT
Start: 2017-10-09

## 2017-10-09 RX ORDER — IBUPROFEN 800 MG/1
TABLET, FILM COATED ORAL
Qty: 90 TABLET | Refills: 3 | Status: CANCELLED | OUTPATIENT
Start: 2017-10-09

## 2017-10-09 RX ORDER — PRAVASTATIN SODIUM 40 MG
TABLET ORAL
Qty: 90 TABLET | Refills: 2 | Status: CANCELLED | OUTPATIENT
Start: 2017-10-09

## 2017-10-09 RX ORDER — TRIAMTERENE/HYDROCHLOROTHIAZID 37.5-25 MG
1 TABLET ORAL DAILY
Qty: 90 TABLET | Refills: 2 | Status: CANCELLED | OUTPATIENT
Start: 2017-10-09

## 2017-10-09 NOTE — TELEPHONE ENCOUNTER
These were last sent to a [facility], now requesting them be sent to a [facility]      triamterene-hydrochlorothiazide (MAXZIDE-25) 37.5-25 MG per tablet      Last Written Prescription Date: 05/24/17  Last Fill Quantity: 90, # refills: 2  Last Office Visit with [facility], [facility] or [facility] prescribing provider: 05/03/17       Potassium   Date Value Ref Range Status   05/03/2017 3.4 3.4 - 5.3 mmol/L Final     Creatinine   Date Value Ref Range Status   05/03/2017 0.86 0.52 - 1.04 mg/dL Final     BP Readings from Last 3 Encounters:   05/03/17 132/82   03/16/17 124/78   01/04/17 131/75       pravastatin (PRAVACHOL) 40 MG tablet     Last Written Prescription Date: 07/17/17  Last Fill Quantity: 90, # refills: 2  Last Office Visit with [facility] or [facility] prescribing provider: 05/03/17       Lab Results   Component Value Date    CHOL 212 05/03/2017     Lab Results   Component Value Date    HDL 97 05/03/2017     Lab Results   Component Value Date     05/03/2017     Lab Results   Component Value Date    TRIG 66 05/03/2017     Lab Results   Component Value Date    CHOLHDLRATIO 2.5 11/10/2015         metoprolol (TOPROL-XL) 50 MG 24 hr tablet      Last Written Prescription Date: 07/06/17  Last Fill Quantity: 90, # refills: 3    Last Office Visit with [facility], [facility] or [facility] prescribing provider:  05/03/17   Future Office Visit:        BP Readings from Last 3 Encounters:   05/03/17 132/82   03/16/17 124/78   01/04/17 131/75         ibuprofen (ADVIL/MOTRIN) 800 MG tablet      Last Written Prescription Date: 08/28/17  Last Quantity: 90, # refills: 3  Last Office Visit with [facility], [facility] or [facility] prescribing provider: 05/03/17       Creatinine   Date Value Ref Range Status   05/03/2017 0.86 0.52 - 1.04 mg/dL Final     Lab Results   Component Value Date    AST 16 05/11/2016     Lab Results   Component Value Date    ALT 22 05/03/2017     BP Readings from Last 3 Encounters:   05/03/17 132/82   03/16/17 124/78   01/04/17 131/75         Nikkie  Ann  [facility]

## 2017-10-09 NOTE — TELEPHONE ENCOUNTER
..Reason for Call:  Other     Detailed comments: Patient called said she need her refills sent to [facility] [address] in [facility]    Phone Number Patient can be reached at: Home number on file [phone] (home)    Best Time: anytime    Can we leave a detailed message on this number? YES    Call taken on 10/9/2017 at 10:59 AM by Zia Hui

## 2017-10-10 NOTE — TELEPHONE ENCOUNTER
This writer attempted to contact Marlon on 10/10/17      Reason for call left voicemail message that there are refills available at the [facility] pharmacy and that if she wants to switch pharmacies that she would have to contact [facility] and have them transferred and left detailed message.    Tanya Yan MA

## 2017-10-10 NOTE — TELEPHONE ENCOUNTER
Patient should contact [facility] and have prescriptions transferred to new pharmacy.

## 2017-10-10 NOTE — TELEPHONE ENCOUNTER
..Reason for Call:   checking status of medication refill requests    Detailed comments: Patient states she is out of the metoprolol, and triamterene hydrochlorothiazide    *discussed refill request policy and Patient voiced her concern about being out of her BLOOD PRESSURE medications ;     *needs to use [facility] CARLOS Mendoza [phone]    Phone Number Patient can be reached at: Home number on file [phone] (home)    Best Time: anytime    Can we leave a detailed message on this number? YES    Call taken on 10/10/2017 at 10:37 AM by Lata Disla

## 2017-10-24 ENCOUNTER — OFFICE VISIT (OUTPATIENT)
Dept: FAMILY MEDICINE | Facility: CLINIC | Age: 67
End: 2017-10-24
Payer: MEDICARE

## 2017-10-24 VITALS
BODY MASS INDEX: 37.97 KG/M2 | HEART RATE: 67 BPM | DIASTOLIC BLOOD PRESSURE: 72 MMHG | OXYGEN SATURATION: 97 % | WEIGHT: 176 LBS | HEIGHT: 57 IN | SYSTOLIC BLOOD PRESSURE: 130 MMHG | TEMPERATURE: 98.6 F

## 2017-10-24 DIAGNOSIS — M48.061 SPINAL STENOSIS OF LUMBAR REGION WITHOUT NEUROGENIC CLAUDICATION: ICD-10-CM

## 2017-10-24 DIAGNOSIS — E78.5 HYPERLIPIDEMIA LDL GOAL <130: ICD-10-CM

## 2017-10-24 DIAGNOSIS — J01.01 ACUTE RECURRENT MAXILLARY SINUSITIS: Primary | ICD-10-CM

## 2017-10-24 DIAGNOSIS — I10 ESSENTIAL HYPERTENSION WITH GOAL BLOOD PRESSURE LESS THAN 140/90: ICD-10-CM

## 2017-10-24 DIAGNOSIS — Z12.31 ENCOUNTER FOR SCREENING MAMMOGRAM FOR BREAST CANCER: ICD-10-CM

## 2017-10-24 DIAGNOSIS — Z23 NEED FOR PROPHYLACTIC VACCINATION AND INOCULATION AGAINST INFLUENZA: ICD-10-CM

## 2017-10-24 LAB
ALT SERPL W P-5'-P-CCNC: 20 U/L (ref 0–50)
ANION GAP SERPL CALCULATED.3IONS-SCNC: 6 MMOL/L (ref 3–14)
BUN SERPL-MCNC: 23 MG/DL (ref 7–30)
CALCIUM SERPL-MCNC: 9.3 MG/DL (ref 8.5–10.1)
CHLORIDE SERPL-SCNC: 106 MMOL/L (ref 94–109)
CHOLEST SERPL-MCNC: 197 MG/DL
CO2 SERPL-SCNC: 29 MMOL/L (ref 20–32)
CREAT SERPL-MCNC: 0.85 MG/DL (ref 0.52–1.04)
GFR SERPL CREATININE-BSD FRML MDRD: 66 ML/MIN/1.7M2
GLUCOSE SERPL-MCNC: 79 MG/DL (ref 70–99)
HDLC SERPL-MCNC: 86 MG/DL
LDLC SERPL CALC-MCNC: 93 MG/DL
NONHDLC SERPL-MCNC: 111 MG/DL
POTASSIUM SERPL-SCNC: 3.4 MMOL/L (ref 3.4–5.3)
SODIUM SERPL-SCNC: 141 MMOL/L (ref 133–144)
TRIGL SERPL-MCNC: 92 MG/DL

## 2017-10-24 PROCEDURE — G0008 ADMIN INFLUENZA VIRUS VAC: HCPCS | Performed by: FAMILY MEDICINE

## 2017-10-24 PROCEDURE — 80048 BASIC METABOLIC PNL TOTAL CA: CPT | Performed by: FAMILY MEDICINE

## 2017-10-24 PROCEDURE — 99214 OFFICE O/P EST MOD 30 MIN: CPT | Mod: 25 | Performed by: FAMILY MEDICINE

## 2017-10-24 PROCEDURE — 80061 LIPID PANEL: CPT | Performed by: FAMILY MEDICINE

## 2017-10-24 PROCEDURE — 90662 IIV NO PRSV INCREASED AG IM: CPT | Performed by: FAMILY MEDICINE

## 2017-10-24 PROCEDURE — 36415 COLL VENOUS BLD VENIPUNCTURE: CPT | Performed by: FAMILY MEDICINE

## 2017-10-24 PROCEDURE — 84460 ALANINE AMINO (ALT) (SGPT): CPT | Performed by: FAMILY MEDICINE

## 2017-10-24 NOTE — PATIENT INSTRUCTIONS
At [facility], we strive to deliver an exceptional experience to you, every time we see you.  If you receive a survey in the mail, please send us back your thoughts. We really do value your feedback.    Based on your medical history, these are the current health maintenance/preventive care services that you are due for (some may have been done at this visit.)  Health Maintenance Due   Topic Date Due     DEXA SCAN SCREENING (SYSTEM ASSIGNED)  11/10/2015     INFLUENZA VACCINE (SYSTEM ASSIGNED)  2017     MICHELLE QUESTIONNAIRE 1 YEAR  10/17/2017         Suggested websites for health information:  Www.[employer] : Up to date and easily searchable information on multiple topics.  Www.medlineplus.gov : medication info, interactive tutorials, watch real surgeries online  Www.familydoctor.org : good info from the Academy of Family Physicians  Www.cdc.gov : public health info, travel advisories, epidemics (H1N1)  Www.aap.org : children's health info, normal development, vaccinations  Www.health.[facility].[place].us : [place] dept of health, public health issues in [place], N1N1    Your care team:                            Family Medicine Internal Medicine   MD Brandan Worthy MD Shantel Branch-Fleming, MD Katya Georgiev PA-C Nam Ho, MD Pediatrics   J LUIS Swartz, MD Renata Bauer CNP, MD Deborah Mielke, MD Kim Thein, APRN [facility]      Clinic hours: Monday - Thursday 7 am-7 pm;  7 am-5 pm.   Urgent care: Monday - Friday 11 am-9 pm; Saturday and  9 am-5 pm.  Pharmacy : Monday -Thursday 8 am-8 pm; Friday 8 am-6 pm; Saturday and  9 am-5 pm.     Clinic: [phone]   Pharmacy: [phone]      Please call [facility] Radiology/[phone] to schedule your mammogram.     Do not take Ibuprofen and Diclofenac on the same day.

## 2017-10-24 NOTE — MR AVS SNAPSHOT
After Visit Summary   10/24/2017    Marlon Roberto    MRN: [id number]           Patient Information     Date Of Birth          1950        Visit Information        Provider Department      10/24/2017 2:20 PM Lambert Moss MD [facility]        Today's Diagnoses     Acute recurrent maxillary sinusitis    -  1    Hyperlipidemia LDL goal <130        Essential hypertension with goal blood pressure less than 140/90        Spinal stenosis of lumbar region without neurogenic claudication        Encounter for screening mammogram for breast cancer        Need for prophylactic vaccination and inoculation against influenza          Care Instructions    At [facility], we strive to deliver an exceptional experience to you, every time we see you.  If you receive a survey in the mail, please send us back your thoughts. We really do value your feedback.    Based on your medical history, these are the current health maintenance/preventive care services that you are due for (some may have been done at this visit.)  Health Maintenance Due   Topic Date Due     DEXA SCAN SCREENING (SYSTEM ASSIGNED)  11/10/2015     INFLUENZA VACCINE (SYSTEM ASSIGNED)  09/01/2017     MICHELLE QUESTIONNAIRE 1 YEAR  10/17/2017         Suggested websites for health information:  Www.[employer] : Up to date and easily searchable information on multiple topics.  Www.medlineplus.gov : medication info, interactive tutorials, watch real surgeries online  Www.familydoctor.org : good info from the Academy of Family Physicians  Www.cdc.gov : public health info, travel advisories, epidemics (H1N1)  Www.aap.org : children's health info, normal development, vaccinations  Www.health.[facility].[place].us : [place] dept of health, public health issues in [place], N1N1    Your care team:                            Family Medicine Internal Medicine   MD Brandan Worthy MD Shantel Branch-Fleming, MD Katya  Brooklyn Forbes MD Pediatrics   J LUIS Swartz, RAVINDER Woodson APRN MD Renata Pena MD Deborah Mielke, MD Kim Thein, APRTONI [facility]      Clinic hours: Monday - Thursday 7 am-7 pm;  7 am-5 pm.   Urgent care: Monday - Friday 11 am-9 pm; Saturday and  9 am-5 pm.  Pharmacy : Monday -Thursday 8 am-8 pm; Friday 8 am-6 pm; Saturday and  9 am-5 pm.     Clinic: [phone]   Pharmacy: [phone]      Please call [facility] Radiology/[phone] to schedule your mammogram.     Do note take Ibuprofen and Diclofenac on the same day.          Follow-ups after your visit        Additional Services     OTOLARYNGOLOGY REFERRAL       Your provider has referred you to:     [facility] [phone]   [email or web address]  [facility] [phone]   [email or web address]/  [facility] [phone]   [email or web address]/  [facility] [phone]   [email or web address]    Please be aware that coverage of these services is subject to the terms and limitations of your health insurance plan.  Call member services at your health plan with any benefit or coverage questions.      Please bring the following with you to your appointment:    (1) Any X-Rays, CTs or MRIs which have been performed.  Contact the facility where they were done to arrange for  prior to your scheduled appointment.   (2) List of current medications  (3) This referral request   (4) Any documents/labs given to you for this referral                  Follow-up notes from your care team     Return in about 2 weeks (around 2017) for recheck if symptoms fail to resolve by then.      Future tests that were ordered for you today     Open Future Orders        Priority  "Expected Expires Ordered    *MA Screening Digital Bilateral Routine  10/24/2018 10/24/2017            Who to contact     If you have questions or need follow up information about today's clinic visit or your schedule please contact [facility] CORBY DANIELLE directly at [phone].  Normal or non-critical lab and imaging results will be communicated to you by MyChart, letter or phone within 4 business days after the clinic has received the results. If you do not hear from us within 7 days, please contact the clinic through MyChart or phone. If you have a critical or abnormal lab result, we will notify you by phone as soon as possible.  Submit refill requests through [employer] or call your pharmacy and they will forward the refill request to us. Please allow 3 business days for your refill to be completed.          Additional Information About Your Visit        MyChart Information     [employer] lets you send messages to your doctor, view your test results, renew your prescriptions, schedule appointments and more. To sign up, go to [email or web address] . Click on \"Log in\" on the left side of the screen, which will take you to the Welcome page. Then click on \"Sign up Now\" on the right side of the page.     You will be asked to enter the access code listed below, as well as some personal information. Please follow the directions to create your username and password.     Your access code is: [email or web address]  Expires: 2018  2:52 PM     Your access code will  in 90 days. If you need help or a new code, please call your [facility] clinic or [phone].        Care EveryWhere ID     This is your Care EveryWhere ID. This could be used by other organizations to access your [facility] medical records  [id number]        Your Vitals Were     Pulse Temperature Height Pulse Oximetry BMI (Body Mass Index)       67 98.6  F (37  C) (Oral) 1.448 m (4' 9\") 97% 38.09 kg/m2        Blood Pressure from Last 3 Encounters: "   10/24/17 130/72   05/03/17 132/82   03/16/17 124/78    Weight from Last 3 Encounters:   10/24/17 79.8 kg (176 lb)   05/03/17 78.5 kg (173 lb)   03/16/17 78 kg (172 lb)              We Performed the Following          ADMIN VACCINE, FIRST [88190]     ALT     Basic metabolic panel     FLU VACCINE, INCREASED ANTIGEN, PRESV FREE, AGE 65+ [77799]     JUST IN CASE     Lipid panel reflex to direct LDL Non-fasting     OTOLARYNGOLOGY REFERRAL          Today's Medication Changes          These changes are accurate as of: 10/24/17  2:52 PM.  If you have any questions, ask your nurse or doctor.               Start taking these medicines.        Dose/Directions    amoxicillin-clavulanate 875-125 MG per tablet   Commonly known as:  AUGMENTIN   Used for:  Acute recurrent maxillary sinusitis   Started by:  Lambert Moss MD        Dose:  1 tablet   Take 1 tablet by mouth 2 times daily for 10 days for sinus infection.   Quantity:  20 tablet   Refills:  0       diclofenac 50 MG EC tablet   Commonly known as:  VOLTAREN   Used for:  Spinal stenosis of lumbar region without neurogenic claudication   Started by:  Lambert Moss MD        Dose:  50 mg   Take 1 tablet (50 mg) by mouth 3 times daily as needed for severe back/joint pain. Take with food.   Quantity:  60 tablet   Refills:  2            Where to get your medicines      [facility] [place], [place] - [address] AT [facility] [address], [facility] [address]     Phone:  [phone]     amoxicillin-clavulanate 875-125 MG per tablet    diclofenac 50 MG EC tablet                Primary Care Provider Office Phone # Fax #    Lambert Moss [phone] [address]  [facility] [address]        Equal Access to Services     [facility] KIESHA AH: Hadii ankur vásquez hadasho Soomaali, waaxda luqadaha, qaybta kaalmada adeegyada, jose daniel david. So [facility]  [phone].    ATENCIÓN: Si dharmesh gomez, tiene a lopez disposición servicios gratuitos de asistencia lingüística. Terra du [phone].    We comply with applicable federal civil rights laws and [place] laws. We do not discriminate on the basis of race, color, national origin, age, disability, sex, sexual orientation, or gender identity.            Thank you!     Thank you for choosing [facility]  for your care. Our goal is always to provide you with excellent care. Hearing back from our patients is one way we can continue to improve our services. Please take a few minutes to complete the written survey that you may receive in the mail after your visit with us. Thank you!             Your Updated Medication List - Protect others around you: Learn how to safely use, store and throw away your medicines at www.disposemymeds.org.          This list is accurate as of: 10/24/17  2:52 PM.  Always use your most recent med list.                   Brand Name Dispense Instructions for use Diagnosis    amoxicillin-clavulanate 875-125 MG per tablet    AUGMENTIN    20 tablet    Take 1 tablet by mouth 2 times daily for 10 days for sinus infection.    Acute recurrent maxillary sinusitis       aspirin 325 MG EC tablet      1 tablet daily*        cetirizine 10 MG tablet    zyrTEC    365 tablet    Take 1 tablet (10 mg) by mouth 2 times daily as needed for allergies (itching)    Allergic contact dermatitis due to pollen       diclofenac 50 MG EC tablet    VOLTAREN    60 tablet    Take 1 tablet (50 mg) by mouth 3 times daily as needed for severe back/joint pain. Take with food.    Spinal stenosis of lumbar region without neurogenic claudication       gabapentin 100 MG capsule    NEURONTIN    180 capsule    Take two tablets three times per day    Cervicalgia, Chronic bilateral low back pain with left-sided sciatica, Cervical radiculopathy, Lumbar radiculopathy       ibuprofen 800 MG tablet    ADVIL/MOTRIN    90  tablet    TAKE 1 TABLET BY MOUTH 3 TIMES DAILY WITH MEALS AS NEEDED FOR JOINT/BACK PAIN    Spinal stenosis of lumbar region without neurogenic claudication       methocarbamol 500 MG tablet    ROBAXIN    30 tablet    Take 2 tablets (1,000 mg) by mouth 3 times daily as needed for muscle spasms    Spinal stenosis, lumbar region, without neurogenic claudication, Chronic bilateral low back pain with left-sided sciatica       metoprolol 50 MG 24 hr tablet    TOPROL-XL    90 tablet    TAKE 1 TABLET BY MOUTH ONCE DAILY FOR BLOOD PRESSURE.    Essential hypertension with goal blood pressure less than 140/90       order for DME     1 Device    Equipment being ordered: TEDS socks, closed toe calf high.    Edema leg, S/P TKR (total knee replacement), bilateral       pravastatin 40 MG tablet    PRAVACHOL    90 tablet    TAKE ONE TABLET BY MOUTH DAILY IN THE EVENING .    Hyperlipidemia LDL goal <130       triamterene-hydrochlorothiazide 37.5-25 MG per tablet    MAXZIDE-25    90 tablet    TAKE ONE TABLET BY MOUTH DAILY    Essential hypertension, benign       TYLENOL PO

## 2017-10-24 NOTE — PROGRESS NOTES
"  SUBJECTIVE:   Marlon Roberto is a 66 year old female who presents to clinic today for the following health issues:    Acute Illness   Acute illness concerns: sinus  Onset: ongoing    Fever: no    Chills/Sweats: no    Headache (location?): no sometime    Sinus Pressure:YES - both frontal and maxillary    Foul odor in her nose - YES    Conjunctivitis:  no    Ear Pain: no    Rhinorrhea: no, but post nasal drainage is very frequent    Congestion: no    Sore Throat: no     Cough: no    Wheeze: no    Decreased Appetite: no    Nausea: no    Vomiting: no    Diarrhea:  no    Dysuria/Freq.: no    Fatigue/Achiness: YES- back pain    Sick/Strep Exposure: no  - Hx of recurrent sinus infections, treatment usually works for a little bit   Therapies Tried and outcome: none    Back pain:  She would like a medication for this back pain issue that is stronger than ibuprofen, but she does not want to use narcotics.     Hemorrhoids:  Patient notes that she was told she had hemorrhoids when she had her colonoscopy done, but these are not currently symptomatic for her - she questions if there is anything she needs to do for this issue.     Medications updated and reviewed.  Past, family and surgical history is updated and reviewed in the record.    ROS:  Other than noted above, general, HEENT, respiratory, cardiac and gastrointestinal systems are negative.    This document serves as a record of the services and decisions personally performed and made by Dr. Moss. It was created on his behalf by Denice Buitrago, a trained medical scribe. The creation of this document is based the provider's statements to the medical scribe.  Denice Buitrago October 24, 2017 2:37 PM     OBJECTIVE:                                                    /72 (BP Location: Left arm, Patient Position: Chair, Cuff Size: Adult Regular)  Pulse 67  Temp 98.6  F (37  C) (Oral)  Ht 1.448 m (4' 9\")  Wt 79.8 kg (176 lb)  SpO2 97%  BMI 38.09 kg/m2 "   Body mass index is 38.09 kg/(m^2).     GENERAL APPEARANCE ADULT: Alert, no acute distress  EYES: PERRL, EOM normal, conjunctiva and lids normal  HENT: right TM normal, left TM: cerumen/skin/hair present, obscuring the view, nose mucosal edema R > L, frontal sinus tenderness no, maxillary sinus tenderness no, throat/mouth:normal, mucous membranes moist   RESP: lungs clear to auscultation   CV: normal rate, regular rhythm, no murmur or gallop  MS: extremities normal, no peripheral edema  SKIN: no suspicious lesions or rashes  NEURO: Alert, oriented, speech and mentation normal, cranial nerves 2-12 are normal.  PSYCH: mentation appears normal., affect and mood normal    Diagnostic Test Results:  none      ASSESSMENT/PLAN:                                                      (J01.01) Acute recurrent maxillary sinusitis  (primary encounter diagnosis)  Comment: And probably frontal.  Plan: OTOLARYNGOLOGY REFERRAL,         amoxicillin-clavulanate (AUGMENTIN) 875-125 MG         per tablet        Return in about 2 weeks (around 11/7/2017) for recheck if symptoms fail to resolve by then. I want her to see ENT regardless since we treat her sinus symptoms frequently.     (E78.5) Hyperlipidemia LDL goal <130  Comment: Non-fasting lab update. Historically at goal.  Plan: Lipid panel reflex to direct LDL Non-fasting,         ALT        Follow up in roughly 6 months. Nursing can refill through May.     (I10) Essential hypertension with goal blood pressure less than 140/90  Comment: Well controlled. Lab update  Plan: Basic metabolic panel, JUST IN CASE        Follow up in roughly 6 months. Nursing can refill through May.     (M48.061) Spinal stenosis of lumbar region without neurogenic claudication  Comment: See discussion above.   Plan: diclofenac (VOLTAREN) 50 MG EC tablet        Patient voices her understanding that if she still wants to use ibuprofen, she cannot use both NSAIDs on the same day.    (Z12.31) Encounter for  screening mammogram for breast cancer  Comment:   Plan: *MA Screening Digital Bilateral            (Z23) Need for prophylactic vaccination and inoculation against influenza  Comment: Influenza vaccine requested and given.   Plan: FLU VACCINE, INCREASED ANTIGEN, PRESV FREE, AGE        65+ [32086],      ADMIN VACCINE, FIRST [43192]                    The information in this document, created by the medical scribe for me, accurately reflects the services I personally performed and the decisions made by me. I have reviewed and approved this document for accuracy prior to leaving the patient care area. October 24, 2017 2:37 PM   Lambert Moss MD

## 2017-10-24 NOTE — NURSING NOTE
Injectable Influenza Immunization Documentation    1.  Are you sick today? (Fever of 100.5 or higher on the day of the clinic)   No    2.  Have you ever had Guillain-Del[place] Syndrome within 6 weeks of an influenza vaccionation?  No    3. Do you have a life-threatening allergy to eggs?  No    4. Do you have a life-threatening allergy to a component of the vaccine? May include antibiotics, gelatin or latex.  No     5. Have you ever had a reaction to a dose of flu vaccine that needed immediate medical attention?  No     Form completed by Ambrose Lewis MA

## 2017-10-24 NOTE — NURSING NOTE
"Chief Complaint   Patient presents with     Sinus Problem       Initial /72 (BP Location: Left arm, Patient Position: Chair, Cuff Size: Adult Regular)  Pulse 67  Temp 98.6  F (37  C) (Oral)  Ht 4' 9\" (1.448 m)  Wt 176 lb (79.8 kg)  SpO2 97%  BMI 38.09 kg/m2 Estimated body mass index is 38.09 kg/(m^2) as calculated from the following:    Height as of this encounter: 4' 9\" (1.448 m).    Weight as of this encounter: 176 lb (79.8 kg).  Medication Reconciliation: complete     Ambrose Lewis MA      "

## 2017-10-24 NOTE — LETTER
2017      Marlon Roberto  [address]  [place] [address]              Dear Marlon Roberto      All of your labs were normal.     Enclosed is a copy of the results.  It was a pleasure to see you at your last appointment.    If you have any questions or concerns, please call myself or my nurse at [phone].      Sincerely,      Lambert Moss MD/RYANN Funes MA  [employer]      Results for orders placed or performed in visit on 10/24/17   Lipid panel reflex to direct LDL Non-fasting   Result Value Ref Range    Cholesterol 197 <200 mg/dL    Triglycerides 92 <150 mg/dL    HDL Cholesterol 86 >49 mg/dL    LDL Cholesterol Calculated 93 <100 mg/dL    Non HDL Cholesterol 111 <130 mg/dL   ALT   Result Value Ref Range    ALT 20 0 - 50 U/L   Basic metabolic panel   Result Value Ref Range    Sodium 141 133 - 144 mmol/L    Potassium 3.4 3.4 - 5.3 mmol/L    Chloride 106 94 - 109 mmol/L    Carbon Dioxide 29 20 - 32 mmol/L    Anion Gap 6 3 - 14 mmol/L    Glucose 79 70 - 99 mg/dL    Urea Nitrogen 23 7 - 30 mg/dL    Creatinine 0.85 0.52 - 1.04 mg/dL    GFR Estimate 66 >60 mL/min/1.7m2    GFR Estimate If Black 80 >60 mL/min/1.7m2    Calcium 9.3 8.5 - 10.1 mg/dL                 RE: Marlon Roberto   MRN: [id number]  : 1950  ENC DATE: Oct 24, 2017

## 2018-01-08 DIAGNOSIS — I10 ESSENTIAL HYPERTENSION, BENIGN: ICD-10-CM

## 2018-01-08 NOTE — TELEPHONE ENCOUNTER
"Requested Prescriptions   Pending Prescriptions Disp Refills     triamterene-hydrochlorothiazide (MAXZIDE-25) 37.5-25 MG per tablet [Pharmacy Med Name: TRIAMTERENE 37.5MG/ HCTZ  25MG TABS]  Last Written Prescription Date:  05/24/17  Last Fill Quantity: 90,  # refills: 2   Last Office Visit with [facility], [facility] or [facility] prescribing provider:  10/24/17   Future Office Visit:    90 tablet 0     Sig: TAKE 1 TABLET BY MOUTH EVERY DAY    Diuretics (Including Combos) Protocol Failed    1/8/2018  8:37 AM       Failed - Recent or future visit with authorizing provider's specialty    Patient had office visit in the last year or has a visit in the next 30 days with authorizing provider.  See \"Choose Columns\" in Meds & Orders section of the refill encounter.              Passed - Blood pressure under 140/90    BP Readings from Last 3 Encounters:   10/24/17 130/72   05/03/17 132/82   03/16/17 124/78                Passed - Patient is age 18 or older       Passed - No active pregancy on record       Passed - Normal serum creatinine on file in past 12 months    Recent Labs   Lab Test  10/24/17   1457   CR  0.85             Passed - Normal serum potassium on file in past 12 months    Recent Labs   Lab Test  10/24/17   1457   POTASSIUM  3.4                   Passed - Normal serum sodium on file in past 12 months    Recent Labs   Lab Test  10/24/17   1457   NA  141             Passed - No positive pregnancy test in past 12 months          "

## 2018-01-09 ENCOUNTER — OFFICE VISIT (OUTPATIENT)
Dept: OTOLARYNGOLOGY | Facility: CLINIC | Age: 68
End: 2018-01-09
Payer: COMMERCIAL

## 2018-01-09 DIAGNOSIS — R43.9 DISTURBANCES OF SENSATION OF SMELL AND TASTE: ICD-10-CM

## 2018-01-09 DIAGNOSIS — J32.4 CHRONIC PANSINUSITIS: Primary | ICD-10-CM

## 2018-01-09 PROCEDURE — 87070 CULTURE OTHR SPECIMN AEROBIC: CPT | Performed by: OTOLARYNGOLOGY

## 2018-01-09 PROCEDURE — 87077 CULTURE AEROBIC IDENTIFY: CPT | Performed by: OTOLARYNGOLOGY

## 2018-01-09 PROCEDURE — 99204 OFFICE O/P NEW MOD 45 MIN: CPT | Performed by: OTOLARYNGOLOGY

## 2018-01-09 PROCEDURE — 87102 FUNGUS ISOLATION CULTURE: CPT | Performed by: OTOLARYNGOLOGY

## 2018-01-09 PROCEDURE — 87075 CULTR BACTERIA EXCEPT BLOOD: CPT | Performed by: OTOLARYNGOLOGY

## 2018-01-09 PROCEDURE — 87106 FUNGI IDENTIFICATION YEAST: CPT | Performed by: OTOLARYNGOLOGY

## 2018-01-09 RX ORDER — TRIAMTERENE/HYDROCHLOROTHIAZID 37.5-25 MG
TABLET ORAL
Qty: 90 TABLET | Refills: 0 | Status: SHIPPED | OUTPATIENT
Start: 2018-01-09

## 2018-01-09 NOTE — TELEPHONE ENCOUNTER
Maxzide  Prescription approved per [facility] Refill Protocol.    Raz Lorenz RN, BSN

## 2018-01-09 NOTE — LETTER
1/9/2018         RE: Marlon Roberto  [address]  [facility] [address]        Dear Colleague,    Thank you for referring your patient, Marlon Roberto, to the [facility]. Please see a copy of my visit note below.    History of Present Illness - Marlon Roberto is a 67 year old female here to see me for the first time for sinus issues.  But actually, she saw me in 2010.    The main issue is foul smelling post nasal drainage.  She tells me that she has had this issue for many years.  She tried medicated irrigations and antifungals, but feels that a month of that did not help.  She has never had any surgery.  She also tells me that she is moving out of [place], and wanted to have this issue evaluated one more time.    Past Medical History -   Patient Active Problem List   Diagnosis     Essential hypertension with goal blood pressure less than 140/90     Hyperlipidemia LDL goal <130     PSVT (paroxysmal supraventricular tachycardia) (H)     GERD (gastroesophageal reflux disease)     Osteopenia     Mantoux: positive     S/P TKR (total knee replacement)     Advance care planning     Venous insufficiency of leg     Severe obesity (BMI 35.0-39.9) with comorbidity (H)     Family history of breast cancer in sister     Left sided sciatica     Vitamin D deficiency     Lumbar spinal stenosis       Current Medications -   Current Outpatient Prescriptions:      triamterene-hydrochlorothiazide (MAXZIDE-25) 37.5-25 MG per tablet, TAKE 1 TABLET BY MOUTH EVERY DAY, Disp: 90 tablet, Rfl: 0     diclofenac (VOLTAREN) 50 MG EC tablet, Take 1 tablet (50 mg) by mouth 3 times daily as needed for severe back/joint pain. Take with food., Disp: 60 tablet, Rfl: 2     ibuprofen (ADVIL/MOTRIN) 800 MG tablet, TAKE 1 TABLET BY MOUTH 3 TIMES DAILY WITH MEALS AS NEEDED FOR JOINT/BACK PAIN, Disp: 90 tablet, Rfl: 3     pravastatin (PRAVACHOL) 40 MG tablet, TAKE ONE TABLET BY MOUTH DAILY IN THE EVENING  ., Disp: 90 tablet, Rfl: 2     metoprolol (TOPROL-XL) 50 MG 24 hr tablet, TAKE 1 TABLET BY MOUTH ONCE DAILY FOR BLOOD PRESSURE., Disp: 90 tablet, Rfl: 3     gabapentin (NEURONTIN) 100 MG capsule, Take two tablets three times per day, Disp: 180 capsule, Rfl: 1     Acetaminophen (TYLENOL PO), , Disp: , Rfl:      methocarbamol (ROBAXIN) 500 MG tablet, Take 2 tablets (1,000 mg) by mouth 3 times daily as needed for muscle spasms, Disp: 30 tablet, Rfl: 1     cetirizine (ZYRTEC) 10 MG tablet, Take 1 tablet (10 mg) by mouth 2 times daily as needed for allergies (itching), Disp: 365 tablet, Rfl: prn     ORDER FOR DME, Equipment being ordered: TEDS socks, closed toe calf high., Disp: 1 Device, Rfl: o     ASPIRIN 325 MG OR TBEC, 1 tablet daily*, Disp: , Rfl:     Allergies -   Allergies   Allergen Reactions     Ace Inhibitors Cough     Atenolol Other (See Comments)     Dry eyes and throat     Codeine Nausea and Vomiting     Hyzaar Cough       Social History -   Social History     Social History     Marital status: Single     Spouse name:      Number of children: 1     Years of education: 14     Occupational History     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[employer]     Social History Main Topics     Smoking status: Never Smoker     Smokeless tobacco: Never Used     Alcohol use No      Comment: 0-1 drink weekly     Drug use: No     Sexual activity: No     Other Topics Concern      Service No     Blood Transfusions Yes     following knee surgery in  &      Caffeine Concern No     Occupational Exposure No     Hobby Hazards No     Sleep Concern No     Stress Concern No     Weight Concern Yes     Special Diet No     Back Care No     Exercise Yes     Bike Helmet No     Seat Belt Yes     Self-Exams Yes     Social History Narrative    From [facility], in         Family History -   Family History   Problem Relation Age of Onset     HEART DISEASE Father       of war vs SCD     CANCER Sister 46     , unknown primary (yet)      Breast Cancer Paternal Half-Sister      50s     C.A.D. No family hx of      DIABETES No family hx of      Hypertension No family hx of        Review of Systems - As per HPI and PMHx, otherwise 10+ system review of the head and neck, and general constitution is negative.    Physical Exam  There were no vitals taken for this visit.    General - The patient is well nourished and well developed, and appears to have good nutritional status.  Alert and oriented to person and place, answers questions and cooperates with examination appropriately.   Head and Face - Normocephalic and atraumatic, with no gross asymmetry noted of the contour of the facial features.  The facial nerve is intact, with strong symmetric movements.  Voice and Breathing - The patient was breathing comfortably without the use of accessory muscles. There was no wheezing, stridor, or stertor.  The patients voice was clear and strong, and had appropriate pitch and quality.  Ears - The tympanic membranes are normal in appearance, bony landmarks are intact.  No retraction, perforation, or masses.  Normal mobility on valsalva maneuver, with no reports of dizziness on insuflation.  No fluid or purulence was seen in the external canal or the middle ear. No evidence of infection of the middle ear or external canal, cerumen was normal in appearance.  Eyes - Extraocular movements intact, and the pupils were reactive to light.  Sclera were not icteric or injected, conjunctiva were pink and moist.  Mouth - Examination of the oral cavity showed pink, healthy oral mucosa. No lesions or ulcerations noted.  The tongue was mobile and midline, and the dentition were in good condition.    Throat - The walls of the oropharynx were smooth, pink, moist, symmetric, and had no lesions or ulcerations.  The tonsillar pillars and soft palate were symmetric.  The uvula was midline on elevation.    Neck - Normal midline excursion of the laryngotracheal complex during swallowing.   Full range of motion on passive movement.  Palpation of the occipital, submental, submandibular, internal jugular chain, and supraclavicular nodes did not demonstrate any abnormal lymph nodes or masses.  The carotid pulse was palpable bilaterally.  Palpation of the thyroid was soft and smooth, with no nodules or goiter appreciated.  The trachea was mobile and midline.  Nose - External contour is symmetric, no gross deflection or scars.  Nasal mucosa is pink and moist with no abnormal mucus.  The septum was midline and non-obstructive, turbinates of normal size and position.  No polyps, masses, or purulence noted on examination.      A/P - Marlon Roberto is a 67 year old female  (J32.4) Chronic pansinusitis  (primary encounter diagnosis)  (R43.9) Disturbances of sensation of smell and taste  Comment: Marlon presents to me again after 8 years with a report of persistent post nasal drainage and bad odor in the nose.  The exam is very benign however.    I will send aerobic and anaerobic, as well as fungal cultures, and call her with results.  Perhaps we can find a dominant organism to expalin this situation.    We are somewhat limited, as she plans on moving to [place] in March.        Again, thank you for allowing me to participate in the care of your patient.        Sincerely,        Elder Vizcaino MD

## 2018-01-09 NOTE — PROGRESS NOTES
History of Present Illness - Marlon Roberto is a 67 year old female here to see me for the first time for sinus issues.  But actually, she saw me in 2010.    The main issue is foul smelling post nasal drainage.  She tells me that she has had this issue for many years.  She tried medicated irrigations and antifungals, but feels that a month of that did not help.  She has never had any surgery.  She also tells me that she is moving out of [place], and wanted to have this issue evaluated one more time.    Past Medical History -   Patient Active Problem List   Diagnosis     Essential hypertension with goal blood pressure less than 140/90     Hyperlipidemia LDL goal <130     PSVT (paroxysmal supraventricular tachycardia) (H)     GERD (gastroesophageal reflux disease)     Osteopenia     Mantoux: positive     S/P TKR (total knee replacement)     Advance care planning     Venous insufficiency of leg     Severe obesity (BMI 35.0-39.9) with comorbidity (H)     Family history of breast cancer in sister     Left sided sciatica     Vitamin D deficiency     Lumbar spinal stenosis       Current Medications -   Current Outpatient Prescriptions:      triamterene-hydrochlorothiazide (MAXZIDE-25) 37.5-25 MG per tablet, TAKE 1 TABLET BY MOUTH EVERY DAY, Disp: 90 tablet, Rfl: 0     diclofenac (VOLTAREN) 50 MG EC tablet, Take 1 tablet (50 mg) by mouth 3 times daily as needed for severe back/joint pain. Take with food., Disp: 60 tablet, Rfl: 2     ibuprofen (ADVIL/MOTRIN) 800 MG tablet, TAKE 1 TABLET BY MOUTH 3 TIMES DAILY WITH MEALS AS NEEDED FOR JOINT/BACK PAIN, Disp: 90 tablet, Rfl: 3     pravastatin (PRAVACHOL) 40 MG tablet, TAKE ONE TABLET BY MOUTH DAILY IN THE EVENING ., Disp: 90 tablet, Rfl: 2     metoprolol (TOPROL-XL) 50 MG 24 hr tablet, TAKE 1 TABLET BY MOUTH ONCE DAILY FOR BLOOD PRESSURE., Disp: 90 tablet, Rfl: 3     gabapentin (NEURONTIN) 100 MG capsule, Take two tablets three times per day, Disp: 180 capsule, Rfl: 1      Acetaminophen (TYLENOL PO), , Disp: , Rfl:      methocarbamol (ROBAXIN) 500 MG tablet, Take 2 tablets (1,000 mg) by mouth 3 times daily as needed for muscle spasms, Disp: 30 tablet, Rfl: 1     cetirizine (ZYRTEC) 10 MG tablet, Take 1 tablet (10 mg) by mouth 2 times daily as needed for allergies (itching), Disp: 365 tablet, Rfl: prn     ORDER FOR DME, Equipment being ordered: TEDS socks, closed toe calf high., Disp: 1 Device, Rfl: o     ASPIRIN 325 MG OR TBEC, 1 tablet daily*, Disp: , Rfl:     Allergies -   Allergies   Allergen Reactions     Ace Inhibitors Cough     Atenolol Other (See Comments)     Dry eyes and throat     Codeine Nausea and Vomiting     Hyzaar Cough       Social History -   Social History     Social History     Marital status: Single     Spouse name:      Number of children: 1     Years of education: 14     Occupational History     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[employer]     Social History Main Topics     Smoking status: Never Smoker     Smokeless tobacco: Never Used     Alcohol use No      Comment: 0-1 drink weekly     Drug use: No     Sexual activity: No     Other Topics Concern      Service No     Blood Transfusions Yes     following knee surgery in  &      Caffeine Concern No     Occupational Exposure No     Hobby Hazards No     Sleep Concern No     Stress Concern No     Weight Concern Yes     Special Diet No     Back Care No     Exercise Yes     Bike Helmet No     Seat Belt Yes     Self-Exams Yes     Social History Narrative    From [facility], in         Family History -   Family History   Problem Relation Age of Onset     HEART DISEASE Father       of war vs SCD     CANCER Sister 46     , unknown primary (yet)     Breast Cancer Paternal Half-Sister      50s     C.A.D. No family hx of      DIABETES No family hx of      Hypertension No family hx of        Review of Systems - As per HPI and PMHx, otherwise 10+ system review of the head and neck, and general constitution is  negative.    Physical Exam  There were no vitals taken for this visit.    General - The patient is well nourished and well developed, and appears to have good nutritional status.  Alert and oriented to person and place, answers questions and cooperates with examination appropriately.   Head and Face - Normocephalic and atraumatic, with no gross asymmetry noted of the contour of the facial features.  The facial nerve is intact, with strong symmetric movements.  Voice and Breathing - The patient was breathing comfortably without the use of accessory muscles. There was no wheezing, stridor, or stertor.  The patients voice was clear and strong, and had appropriate pitch and quality.  Ears - The tympanic membranes are normal in appearance, bony landmarks are intact.  No retraction, perforation, or masses.  Normal mobility on valsalva maneuver, with no reports of dizziness on insuflation.  No fluid or purulence was seen in the external canal or the middle ear. No evidence of infection of the middle ear or external canal, cerumen was normal in appearance.  Eyes - Extraocular movements intact, and the pupils were reactive to light.  Sclera were not icteric or injected, conjunctiva were pink and moist.  Mouth - Examination of the oral cavity showed pink, healthy oral mucosa. No lesions or ulcerations noted.  The tongue was mobile and midline, and the dentition were in good condition.    Throat - The walls of the oropharynx were smooth, pink, moist, symmetric, and had no lesions or ulcerations.  The tonsillar pillars and soft palate were symmetric.  The uvula was midline on elevation.    Neck - Normal midline excursion of the laryngotracheal complex during swallowing.  Full range of motion on passive movement.  Palpation of the occipital, submental, submandibular, internal jugular chain, and supraclavicular nodes did not demonstrate any abnormal lymph nodes or masses.  The carotid pulse was palpable bilaterally.  Palpation of  the thyroid was soft and smooth, with no nodules or goiter appreciated.  The trachea was mobile and midline.  Nose - External contour is symmetric, no gross deflection or scars.  Nasal mucosa is pink and moist with no abnormal mucus.  The septum was midline and non-obstructive, turbinates of normal size and position.  No polyps, masses, or purulence noted on examination.      A/P - Marlon Roberto is a 67 year old female  (J32.4) Chronic pansinusitis  (primary encounter diagnosis)  (R43.9) Disturbances of sensation of smell and taste  Comment: Marlon presents to me again after 8 years with a report of persistent post nasal drainage and bad odor in the nose.  The exam is very benign however.    I will send aerobic and anaerobic, as well as fungal cultures, and call her with results.  Perhaps we can find a dominant organism to expalin this situation.    We are somewhat limited, as she plans on moving to [place] in March.

## 2018-01-09 NOTE — MR AVS SNAPSHOT
After Visit Summary   1/9/2018    Marlon Roberto    MRN: [id number]           Patient Information     Date Of Birth          1950        Visit Information        Provider Department      1/9/2018 4:15 PM Elder Vizcaino MD [facility]        Today's Diagnoses     Chronic pansinusitis    -  1    Disturbances of sensation of smell and taste          Care Instructions    Scheduling Information  To schedule your CT/MRI scan, please contact [facility] at [phone] OR [facility] at [phone]    To schedule your Surgery, please contact our Specialty Schedulers at [phone]      ENT Clinic Locations Clinic Hours Telephone Number     [facility]  [address]  [place] [address]   Monday:           1:00pm -- 5:00pm    Friday:              8:00am - 12:00pm   To schedule/reschedule an appointment with   Dr. Vizcaino,   please contact our   Specialty Scheduling Department at:     [phone]       [facility]  [address] [place] [address] Tuesday:          8:00am -- 2:00pm         Urgent Care Locations Clinic Hours Telephone Numbers     [facility]  [address] [place] [address]     Monday-Friday:     11:00am - 9:00pm    Saturday-Sunday:  9:00am - 5:00pm   [phone]     [facility]  [address] [place] [address]     Monday-Friday:      5:00pm - 9:00pm     Saturday-Sunday:  9:00am - 5:00pm   [phone]                 Follow-ups after your visit        Who to contact     If you have questions or need follow up information about today's clinic visit or your schedule please contact [facility] directly at [phone].  Normal or non-critical lab and imaging results will be communicated to you by MyChart, letter or phone within 4 business days after the clinic has received the results. If you do not hear from us within 7 days, please contact the clinic through  "Repunchhart or phone. If you have a critical or abnormal lab result, we will notify you by phone as soon as possible.  Submit refill requests through [email or web address] or call your pharmacy and they will forward the refill request to us. Please allow 3 business days for your refill to be completed.          Additional Information About Your Visit        [employer] Information     [email or web address] lets you send messages to your doctor, view your test results, renew your prescriptions, schedule appointments and more. To sign up, go to [email or web address] . Click on \"Log in\" on the left side of the screen, which will take you to the Welcome page. Then click on \"Sign up Now\" on the right side of the page.     You will be asked to enter the access code listed below, as well as some personal information. Please follow the directions to create your username and password.     Your access code is: [email or web address]  Expires: 2018  1:52 PM     Your access code will  in 90 days. If you need help or a new code, please call your [place] clinic or [phone].        Care EveryWhere ID     This is your Care EveryWhere ID. This could be used by other organizations to access your [place] medical records  [id number]         Blood Pressure from Last 3 Encounters:   10/24/17 130/72   17 132/82   17 124/78    Weight from Last 3 Encounters:   10/24/17 79.8 kg (176 lb)   17 78.5 kg (173 lb)   17 78 kg (172 lb)              We Performed the Following     Anaerobic bacterial culture     Fungus Culture, non-blood     Sinus Culture Aerobic Bacterial        Primary Care Provider Office Phone # Fax #    Lambert Moss [phone] [address]  [place] [facility] [address]        Equal Access to Services     [facility]: Marva estevezo Soliv, waaxda luqadaha, qaybta kaalmada adeyumikoyadavid, waxay marsha dvaid. [facility] [phone].    ATENCIÓN: Si chen ralph " disposición servicios gratuitos de asistencia lingüística. Terra du [phone].    We comply with applicable federal civil rights laws and [place] laws. We do not discriminate on the basis of race, color, national origin, age, disability, sex, sexual orientation, or gender identity.            Thank you!     Thank you for choosing [facility]  for your care. Our goal is always to provide you with excellent care. Hearing back from our patients is one way we can continue to improve our services. Please take a few minutes to complete the written survey that you may receive in the mail after your visit with us. Thank you!             Your Updated Medication List - Protect others around you: Learn how to safely use, store and throw away your medicines at www.disposemymeds.org.          This list is accurate as of: 1/9/18  4:45 PM.  Always use your most recent med list.                   Brand Name Dispense Instructions for use Diagnosis    aspirin 325 MG EC tablet      1 tablet daily*        cetirizine 10 MG tablet    zyrTEC    365 tablet    Take 1 tablet (10 mg) by mouth 2 times daily as needed for allergies (itching)    Allergic contact dermatitis due to pollen       diclofenac 50 MG EC tablet    VOLTAREN    60 tablet    Take 1 tablet (50 mg) by mouth 3 times daily as needed for severe back/joint pain. Take with food.    Spinal stenosis of lumbar region without neurogenic claudication       gabapentin 100 MG capsule    NEURONTIN    180 capsule    Take two tablets three times per day    Cervicalgia, Chronic bilateral low back pain with left-sided sciatica, Cervical radiculopathy, Lumbar radiculopathy       ibuprofen 800 MG tablet    ADVIL/MOTRIN    90 tablet    TAKE 1 TABLET BY MOUTH 3 TIMES DAILY WITH MEALS AS NEEDED FOR JOINT/BACK PAIN    Spinal stenosis of lumbar region without neurogenic claudication       methocarbamol 500 MG tablet    ROBAXIN    30 tablet    Take 2 tablets (1,000 mg) by  mouth 3 times daily as needed for muscle spasms    Spinal stenosis, lumbar region, without neurogenic claudication, Chronic bilateral low back pain with left-sided sciatica       metoprolol 50 MG 24 hr tablet    TOPROL-XL    90 tablet    TAKE 1 TABLET BY MOUTH ONCE DAILY FOR BLOOD PRESSURE.    Essential hypertension with goal blood pressure less than 140/90       order for DME     1 Device    Equipment being ordered: TEDS socks, closed toe calf high.    Edema leg, S/P TKR (total knee replacement), bilateral       pravastatin 40 MG tablet    PRAVACHOL    90 tablet    TAKE ONE TABLET BY MOUTH DAILY IN THE EVENING .    Hyperlipidemia LDL goal <130       triamterene-hydrochlorothiazide 37.5-25 MG per tablet    MAXZIDE-25    90 tablet    TAKE 1 TABLET BY MOUTH EVERY DAY    Essential hypertension, benign       TYLENOL PO

## 2018-01-09 NOTE — PATIENT INSTRUCTIONS
Scheduling Information  To schedule your CT/MRI scan, please contact [facility] at [phone] OR [facility] at [phone]    To schedule your Surgery, please contact our Specialty Schedulers at [phone]      ENT Clinic Locations Clinic Hours Telephone Number     [facility]  [address] [place] [address]   Monday:           1:00pm -- 5:00pm    Friday:              8:00am - 12:00pm   To schedule/reschedule an appointment with   Dr. Vizcaino,   please contact our   Specialty Scheduling Department at:     [phone]       [facility]  [address] [place] [address] Tuesday:          8:00am -- 2:00pm         Urgent Care Locations Clinic Hours Telephone Numbers     [facility]  [address] [place] [address]     Monday-Friday:     11:00am - 9:00pm    Saturday-Sunday:  9:00am - 5:00pm   [phone]     [facility]  [address] [place] [address]     Monday-Friday:      5:00pm - 9:00pm     Saturday-Sunday:  9:00am - 5:00pm   [phone]

## 2018-01-12 LAB
BACTERIA SPEC CULT: ABNORMAL
BACTERIA SPEC CULT: ABNORMAL
Lab: ABNORMAL
SPECIMEN SOURCE: ABNORMAL

## 2018-01-15 DIAGNOSIS — B49 ALLERGIC FUNGAL SINUSITIS (AFS): Primary | ICD-10-CM

## 2018-01-15 DIAGNOSIS — J30.89 ALLERGIC FUNGAL SINUSITIS (AFS): Primary | ICD-10-CM

## 2018-01-15 NOTE — PROGRESS NOTES
Called and left a voicemail.  There is positive fungal growth on the nasal swab I took from her nose.  I will try a month of itraconazole nasal irrigation compound to see if we can clear it.

## 2018-01-16 ENCOUNTER — TELEPHONE (OUTPATIENT)
Dept: OTOLARYNGOLOGY | Facility: CLINIC | Age: 68
End: 2018-01-16

## 2018-01-16 DIAGNOSIS — B49 ALLERGIC FUNGAL SINUSITIS: Primary | ICD-10-CM

## 2018-01-16 DIAGNOSIS — J30.89 ALLERGIC FUNGAL SINUSITIS: Primary | ICD-10-CM

## 2018-01-16 NOTE — TELEPHONE ENCOUNTER
Reason for Call:  Other appointment and prescription    Detailed comments:  Patient calling. The rx that was sent to the compounding is not covered by her insurance. It needs a prior authorization. Please start it. Thank you.     Itraconazole is not covered. Please call [phone].     Phone Number Patient can be reached at: Home number on file [phone] (home)    Best Time:  Any     Can we leave a detailed message on this number? YES    Call taken on 1/16/2018 at 3:34 PM by Tasneem Tse

## 2018-01-17 LAB
BACTERIA SPEC CULT: NORMAL
Lab: NORMAL
SPECIMEN SOURCE: NORMAL

## 2018-01-18 RX ORDER — FLUCONAZOLE 150 MG/1
150 TABLET ORAL
Qty: 7 TABLET | Refills: 1 | Status: SHIPPED | OUTPATIENT
Start: 2018-01-18 | End: 2018-02-08

## 2018-01-18 NOTE — TELEPHONE ENCOUNTER
Reason for Call:  Other call back    Detailed comments: Patient calling states Itraconazole is available in a pill form and she would like it called to [id number] - [place], [place] - [address] [facility] [address] Patient is wondering if this can be done instead of having it sent to the compounding pharmacy. Please call.     Phone Number Patient can be reached at: Home number on file [phone] (home)    Best Time: Any     Can we leave a detailed message on this number? YES    Call taken on 1/18/2018 at 3:23 PM by Kaitlynn Echeverria

## 2018-02-07 LAB
FUNGUS SPEC CULT: ABNORMAL
FUNGUS SPEC CULT: ABNORMAL
Lab: ABNORMAL
SPECIMEN SOURCE: ABNORMAL

## 2023-08-31 NOTE — TELEPHONE ENCOUNTER
Called pt and was unable to get through.    Blanca Hogan, RN-BSN  [facility]     This was a shared visit with the SHANNON. I reviewed and verified the documentation and independently performed the documented: